# Patient Record
Sex: FEMALE | Race: WHITE | Employment: FULL TIME | URBAN - METROPOLITAN AREA
[De-identification: names, ages, dates, MRNs, and addresses within clinical notes are randomized per-mention and may not be internally consistent; named-entity substitution may affect disease eponyms.]

---

## 2018-05-04 PROBLEM — D72.810 LYMPHOCYTOPENIA: Status: ACTIVE | Noted: 2018-05-04

## 2018-05-04 PROBLEM — R20.2 TINGLING IN EXTREMITIES: Status: ACTIVE | Noted: 2018-05-04

## 2018-05-04 PROBLEM — Z78.9 VEGAN: Status: ACTIVE | Noted: 2018-05-04

## 2018-05-04 NOTE — PROGRESS NOTES
Payam Lacy is a 33year old female. HPI:     New patient. Finger tips and toe tips with tingling. Intermittent for about 3-4 weeks. Started with the thumbs. Most recently all her toe tips.   Pt is vegan x 3 years, just started B12 supplement 2 denzel nervous/anxious. All other systems reviewed and are negative.       EXAM:   /68 (BP Location: Left arm, Patient Position: Sitting, Cuff Size: large)   Pulse 80   Resp 16   Ht 63\"   Wt 105 lb 3.2 oz   LMP 04/18/2018   BMI 18.64 kg/m²  Estimated bod started. Check B12 level. Consider further workup in the near future such as MRI of brain. - VITAMIN B12; Future    2. Lymphocytopenia  Recheck CBC.    - CBC WITH DIFFERENTIAL WITH PLATELET; Future    3. Vegan  Patient counseled on B12 supplement.

## 2018-05-07 NOTE — TELEPHONE ENCOUNTER
Medical records release, signed by patient, successfully faxed to [address] [place] [address] requesting recent head ct scan.     Medical records release, signed by patient, successfully faxed to Dr. Job Menendez requesting most recent pap report

## 2018-05-09 NOTE — TELEPHONE ENCOUNTER
Medical records received from Dr. Robin Led and placed in the folder to be signed off. Chart updated.

## 2018-05-12 NOTE — TELEPHONE ENCOUNTER
Lab results reviewed:  B12 is above normal.  Recommend decrease intake of B12 by half. Or she could try taking the B12 every other day.   Vitamin D is below normal, patient's medication list includes vitamin D, it may be that she is taking the vitamin D on

## 2018-06-05 NOTE — TELEPHONE ENCOUNTER
A representative of Yovani Peterson M.D. called to inform us that our mutual patient had labs that were ordered by Dr. Hillary Pedraza and done at our office, but they never received the results.   Per request, the lab flowsheet was successfully faxed to [facility], [facility].

## 2018-08-17 NOTE — PROGRESS NOTES
Malorie Lundy is a 33year old female. HPI:       Abdominal pain:  RUQ or right of center. Started 8-9 months ago. Had LFT and u/s that were negative. Pain subsided then came back a few months ago. Intermittent. When present stays constant.   H/o p Disorder Maternal Grandmother    • Heart Attack Maternal Grandfather    • MVA [OTHER] Paternal Grandmother    • Colon Cancer Paternal Grandfather      REVIEW OF SYSTEMS:   GENERAL HEALTH: overall feels well, no fever  SKIN: denies any unusual skin lesions (>17)    3. Anxiety  We will discuss further at next appointment.             Orders Placed This Encounter      Helicobacter pylori Breath Test, Adult    Meds & Refills for this Visit:  Signed Prescriptions Disp Refills    Pantoprazole Sodium 40 MG Oral Tab

## 2018-08-17 NOTE — PATIENT INSTRUCTIONS
Unknown Causes of Abdominal Pain (Female)    The exact cause of your abdominal (stomach) pain is not clear. This does not mean that this is something to worry about.  Everyone likes to know the exact cause of the problem, but sometimes with abdominal pain · Water is important so you do not get dehydrated. Soup may also be good. Sports drinks may also help, especially if they are not too acidic. Make sure you don't drink sugary drinks as this can make things worse. Take liquids in small amounts.  Do not guzzl © 5180-7995 [employer] [address]. [facility], [facility]2 [place]. All rights reserved. This information is not intended as a substitute for professional medical care. Always follow your healthcare professional's instructions.

## 2018-08-20 PROBLEM — F41.9 ANXIETY: Status: ACTIVE | Noted: 2018-08-20

## 2018-08-20 PROBLEM — R10.11 RIGHT UPPER QUADRANT ABDOMINAL PAIN: Status: ACTIVE | Noted: 2018-08-20

## 2018-08-20 PROBLEM — Z86.19 HISTORY OF HELICOBACTER PYLORI INFECTION: Status: ACTIVE | Noted: 2018-08-20

## 2018-08-21 NOTE — TELEPHONE ENCOUNTER
Medical records received from [facility] and placed in the folder to be signed off.

## 2018-08-21 NOTE — TELEPHONE ENCOUNTER
Medical records release, signed by patient, successfully faxed to Dr. Brenda Valentino at [phone], requesting most recent progress note. Medical records release, signed by patient, successfully faxed to [address] [place] [address] at [phone], requesting MRI report.

## 2018-08-21 NOTE — TELEPHONE ENCOUNTER
Pt called with information regarding her previous H Pylori test states she believes she last had it at [facility] in [place] the one on [address]. Pt states Dr. Vineet Vasquez had requested this info from patient.

## 2018-08-22 NOTE — TELEPHONE ENCOUNTER
MRI report received from [address] [place] [address], after review, Dr. Chuyita Hutton is recommending that pt follow up with neuro if she is still having headaches or any other neurologic symptoms.   Lm for pt to call back with a condition update, if symptoms still present refe

## 2018-08-23 NOTE — TELEPHONE ENCOUNTER
----- Message from Gloria Rubio DO sent at 8/22/2018  5:30 PM CDT -----  H pylori is negative, please inform patient. If she is still having symptoms or is worried, recommend make an appointment to see me for follow-up.

## 2018-08-23 NOTE — TELEPHONE ENCOUNTER
Medical records received form [facility] and placed in the folder to be signed off.

## 2018-11-26 NOTE — TELEPHONE ENCOUNTER
Pt called and wants to know if Dr. Yojana Castillo can write a letter of exception from getting the flu shot because last year she got a mild rash and she thinks it was from the shot. She needs to bring it to work.

## 2018-11-26 NOTE — TELEPHONE ENCOUNTER
Asked patient to fax a copy of influenza administration since we do not have any records on file. Patient received immunization at work last year.  She states that she got a mild rash on trunk after receiving vaccine

## 2020-08-13 NOTE — TELEPHONE ENCOUNTER
Patient states she has had a dry cough x2 years on and off. She believes it is allergy related. Denies fever, SOB or other respiratory concerns, loss of smell or taste, sore throat, body aches, N/V/D.      She has had direct exposure to brother and husb

## 2020-08-13 NOTE — TELEPHONE ENCOUNTER
\"BANANA\" has been added to appointment notes for cough and pain. Will be re-screened the day before for other symptoms.

## 2020-08-13 NOTE — TELEPHONE ENCOUNTER
I screened pt for her upcoming appt and she said she has a cough. She did say she has had it for a very long time though.

## 2020-08-13 NOTE — TELEPHONE ENCOUNTER
Has appointment 9/4/20.      Left message for patient to call back to further triage current symptoms

## 2020-09-04 PROBLEM — F41.9 ANXIETY: Status: RESOLVED | Noted: 2018-08-20 | Resolved: 2020-09-04

## 2020-09-04 PROBLEM — R05.9 COUGH: Status: ACTIVE | Noted: 2020-09-04

## 2020-09-04 PROBLEM — Z78.9 VEGAN DIET: Status: ACTIVE | Noted: 2020-09-04

## 2020-09-04 PROBLEM — Z78.9 VEGAN DIET: Status: RESOLVED | Noted: 2020-09-04 | Resolved: 2020-09-04

## 2020-09-04 PROBLEM — R10.13 EPIGASTRIC ABDOMINAL PAIN: Status: ACTIVE | Noted: 2020-09-04

## 2020-09-04 PROBLEM — R20.2 TINGLING IN EXTREMITIES: Status: RESOLVED | Noted: 2018-05-04 | Resolved: 2020-09-04

## 2020-09-04 NOTE — PROGRESS NOTES
HPI:   Alisa Peña is a 35year old female   Symptoms: denies discharge, itching, burning or dysuria, periods are regular.    Last PAP: about 3 years ago  Abnormal PAP: no        Epigastric abdominal pain:  Also in area of right lower ribs medially and • Heart Attack Maternal Grandfather    • Other (MVA) Paternal Grandmother    • Colon Cancer Paternal Grandfather       Social History:   Social History    Tobacco Use      Smoking status: Never Smoker      Smokeless tobacco: Never Used    Alcohol use: Jesenia Remedies rales, rhonchi or wheezing  CARDIO: RRR without murmur normal S1S2  ABD:  normal bowel sounds,soft, non tender, no masses, HSM or tenderness  : External genitalia normal. Speculum exam: Vagina normal, normal vaginal mucosa normal discharge, and normal ce abdominal pain  5. Right upper quadrant abdominal pain  Hiatal hernia versus H. pylori infection versus other. Recurrent epigastric abdominal pain.   Recommend recheck H. pylori breath test and make appointment to see gastroenterologist, Dr. Homa Garvin

## 2020-09-04 NOTE — PATIENT INSTRUCTIONS
-If the cough does not end up being related to a GI issue, please follow-up with Dr. Tri Reed for possible asthma.         Prevention Guidelines, Women Ages 25 to 44  Screening tests and vaccines are an important part of managing your healt during pregnancy after the 24th week.     Gonorrhea Sexually active women at increased risk for infection  At routine exams   Hepatitis C Anyone at increased risk  At routine exams   HIV All women At routine exams3     Obesity All women in this age group polysaccharide vaccine (PPSV23)  Women at increased risk for infection should talk with their healthcare provider  PCV13: 1 dose ages 23 to 72 (protects against 13 types of pneumococcal bacteria)   PPSV23: 1 to 2 doses through age 59, or 1 dose at 72 or ol Cory last reviewed this educational content on 10/1/2017  © 8624-1564 [employer] [address]. 1407 [facility], [facility] [place]. All rights reserved. This information is not intended as a substitute for professional medical care.  Always fo

## 2022-02-03 PROBLEM — R05.3 CHRONIC COUGH: Status: ACTIVE | Noted: 2022-02-03

## 2022-02-03 PROBLEM — E55.9 VITAMIN D INSUFFICIENCY: Status: ACTIVE | Noted: 2022-02-03

## 2022-03-23 PROBLEM — K21.9 GERD (GASTROESOPHAGEAL REFLUX DISEASE): Status: ACTIVE | Noted: 2022-03-23

## 2022-03-23 PROBLEM — K21.00 GERD WITH ESOPHAGITIS: Status: ACTIVE | Noted: 2022-03-23

## 2022-03-23 PROBLEM — R10.11 RIGHT UPPER QUADRANT PAIN: Status: ACTIVE | Noted: 2022-03-23

## 2023-02-01 PROBLEM — R10.11 RIGHT UPPER QUADRANT ABDOMINAL PAIN: Status: RESOLVED | Noted: 2018-08-20 | Resolved: 2023-02-01

## 2023-02-01 PROBLEM — R10.11 RIGHT UPPER QUADRANT PAIN: Status: RESOLVED | Noted: 2022-03-23 | Resolved: 2023-02-01

## 2023-02-01 PROBLEM — R05.3 CHRONIC COUGH: Status: RESOLVED | Noted: 2022-02-03 | Resolved: 2023-02-01

## 2023-02-01 PROBLEM — R05.9 COUGH: Status: RESOLVED | Noted: 2020-09-04 | Resolved: 2023-02-01

## 2023-02-01 PROBLEM — D72.810 LYMPHOCYTOPENIA: Status: RESOLVED | Noted: 2018-05-04 | Resolved: 2023-02-01

## 2023-02-06 NOTE — TELEPHONE ENCOUNTER
Patient calling to initiate prenatal care  LMP 1/3/23  Patient is 7-8 weeks on 2/28/23  Confirmation Ultrasound and Appointment scheduled on   Future Appointments   Date Time Provider Saran Kuo   2/28/2023 11:15 AM [facility] US BOSE [facility] [place]   2/28/2023 11:45 AM Catarina Mc MD [facility] N [facility] [place]   3/28/2023  8:30 AM UZAIR Dodd [facility] P [facility] [address]         Any history of ectopic pregnancy? No  Any history of miscarriage? No  Any medications that you are taking on a regular basis other than prenatal vitamins?  NO- pt questions if its ok to take the prenatal vitamins along with the omega 3/ dha supplements (if not taking prenatal vitamins, encourage patient to start taking.)  Any bleeding since the first day of last LMP and your positive pregnancy test? No    Insurance [facility]

## 2023-02-13 NOTE — ED INITIAL ASSESSMENT (HPI)
Pt states is 6 weeks pregnant, noted right sided pelvic pain this morning, denies vaginal bleeding, sent by OB to r/o ectopic.  .

## 2023-02-14 NOTE — PROGRESS NOTES
1st attempt; pt had recent ED visit, calling to offer PCP f/u apt (dc 2/13)      Dr. Tanisha Ramírez  [address] [facility] ([facility])  [phone]    LVM for pt if assisted still needed call [phone]

## 2023-04-24 PROBLEM — Z34.00 SUPERVISION OF NORMAL FIRST PREGNANCY, ANTEPARTUM: Status: ACTIVE | Noted: 2023-04-24

## 2023-04-24 PROBLEM — Z34.00 SUPERVISION OF NORMAL FIRST PREGNANCY, ANTEPARTUM (HCC): Status: ACTIVE | Noted: 2023-04-24

## 2023-04-24 NOTE — PROGRESS NOTES
AIDA    GA 15w6d   23  1313   BP: 118/68   Pulse: 81   Weight: 116 lb 9.6 oz (52.9 kg)       Doing well. No complaints. Denies abdominal/pelvic pain or vaginal bleeding. Rh +   Genetic screening declined    Recommend Anatomy scan 20 wks   Prenatal labs reviewed   1 episode of epigastric pain associated with her meal.  Resolved. Recommend Tums as needed. Recommend pepcid daily if persistent and or worsening. Problem List Items Addressed This Visit        Gravid and     Supervision of normal first pregnancy, antepartum - Primary   Other Visit Diagnoses     Prenatal care, antepartum        Relevant Orders    URINALYSIS NONAUTO W/O SCOPE (OB URISTIX) (Completed)            RTC in 4 weeks           Note to patient and family   The [address] makes medical notes available to patients in the interest of transparency. However, please be advised that this is a medical document. It is intended as zesl-lu-jsja communication. It is written and medical language may contain abbreviations or verbiage that are technical and unfamiliar. It may appear blunt or direct. Medical documents are intended to carry relevant information, facts as evident, and the clinical opinion of the practitioner.

## 2023-05-25 NOTE — PROGRESS NOTES
AIDA  Doing well, starting to feel much better, only vomiting daily  FM- yes  She will call if she desires the MS AFP, discussed time limitation  RTC 4wks

## 2023-06-13 NOTE — PROGRESS NOTES
Dr. Lety Cruz has been assigned by the health insurance company to be the patient's Primary Care Physician (PCP). LVM for patient to call office, patient sees Dr. Ladonna Russell. Letter sent.

## 2023-06-22 NOTE — PROGRESS NOTES
AIDA  Doing well  FM is good  RH positive  1 hr glucose/ CBC ordered  TDAP information provided  RTC 4wks

## 2023-07-20 NOTE — PROGRESS NOTES
AIDA    GA: 28w2d   23  1006   BP: 116/80   Pulse: 80   Weight: 136 lb 9.6 oz (62 kg)       Doing well, +FM  Denies LOF/VB/uctx  Rh +, TDAP today received, EPDS 0  PTL and Fetal movement instructions given  Repeat HIV  ordered and discussed with patient    Problem List Items Addressed This Visit          Gravid and     Supervision of normal first pregnancy, antepartum - Primary    Relevant Orders    HIV AG AB COMBO     Other Visit Diagnoses       Prenatal care of primigravida, antepartum        Relevant Orders    URINALYSIS NONAUTO W/O SCOPE (OB URISTIX) (Completed)    Need for vaccination        Relevant Orders    IMMUNIZATION ADMINISTRATION    TETANUS, DIPHTHERIA TOXOIDS AND ACELLULAR PERTUSIS VACCINE (TDAP), >7 YEARS, IM USE (Completed)            RTC in 2 wks      Note to patient and family   The [address] makes medical notes available to patients in the interest of transparency. However, please be advised that this is a medical document. It is intended as lazs-it-ivyz communication. It is written and medical language may contain abbreviations or verbiage that are technical and unfamiliar. It may appear blunt or direct. Medical documents are intended to carry relevant information, facts as evident, and the clinical opinion of the practitioner.

## 2023-08-01 NOTE — TELEPHONE ENCOUNTER
Received breast pump orders from [facility]. [facility] signed and I faxed back.  Copy in plfd

## 2023-08-02 NOTE — PROGRESS NOTES
Patient presents with:  Prenatal Care: AIDA    Routine prenatal visit without complaints. Patient denies any bleeding, leaking fluid, cramping, or regular uterine contractions. Good fetal movement. Assessment/Plan:  30w1d doing well  Kick counts reminded  Reviewed  labor signs and symptoms. Reviewed vaccine recommendations: Tdap done. Diagnoses and all orders for this visit:    30 weeks gestation of pregnancy  -     URINALYSIS NONAUTO W/O SCOPE (OB URISTIX)       Return in about 2 weeks (around 2023) for Routine Prenatal Visit.

## 2023-08-02 NOTE — PATIENT INSTRUCTIONS
Pediatrician/Family Practice Referral    Pediatricians:    [phone]  [address]    [address] [facility],  [address]  [place], [address] [facility]    [address], [facility]2 [facility]    [facility]  [address]  [place], [facility]        Family Practice:    Dr. Luz Wynne and Dr. Belgica Murdock [id number]  Jennifer Live  [phone]    Dr. Danny Freeman and Dr. Kathryn Wise  [phone]    Dr. Jenny Mejia Sonny Mahad Diaz  [address], [address] [facility]    Dr. Alberto Bertrand  [address] [facility]. [address] [facility]    Dr. Piper Obrien  [address] [facility], [address] [place] [address]    Dr. Deidre Estrada  [address] [facility]  [place], [address] [facility]    Dr. Vera Dailey  [facility] [address]  [place], [place] [address]     [address] [facility] PRE-REGISTRATION    Thank you for choosing [facility] for you labor and delivery needs. We look forward to caring for you and your family in this exciting time. In order to better care for all of our patients, [facility] recommends that you complete your delivery registration as early in your pregnancy as possible. Registering for your delivery in advance saves you the time of doing so on your day of delivery and allows your care team to be better prepared for your delivery date. For more information about Labor and Delivery at [facility] and to pre-register, visit [address] [facility]. org--> Search Our Services-->Pregnancy & Baby. TWO WAYS TO REGISTER ONLINE    Epic MyChart allows access to multiple medical organizations, including Estelle Angel and other medical organizations that use the [employer] record system.   To add Estelle Angel or any other medical organization, just tap My Organizations at the top left corner of the login page in the [address]ilee, and then click Add Organization. [place] in [place] for your hospital stay through your [employer] account. After logging into [employer], click on Visits. Under Visits, click on [place] for Delivery and follow the directions to register. You may print the confirmation page. If you do not already have a [employer] account, ask our office for an Access Code. Go to [employer]. [email or web address] and follow the prompts to set up your account. [place]alls on [employer]. [employer]- Pre-register for your hospital stay through the convenient online form on our website. Go to [email or web address]. Scroll down the page and click on [address] out all of the required information and click submit. You will receive a confirmation of your submission. Questions about registering for your hospital stay? Contact the [facility] at [phone]. FETAL MOVEMENT CHART    Although not all women need to perform daily fetal movement counts, if you notice a decrease in fetal activity, you should contact our office immediately. Begin counting fetal movements at 32 weeks of pregnancy. You may find that your baby is more active after eating or drinking. We want you to time how long it takes to feel 10 movements (kicks, flutters, swishes or rolls). You should feel at least 10 movements in 2 hours. You will likely feel 10 movements in less than 2 hours. If you have concerns, you can use the attached table to record movements. Record the time you feel the first movement and the tenth movement. This will help you observe patterns and discover how long it normally takes your baby to move 10 times. Please call us if any of the following occurs:   You have not felt the baby move for a couple of hours  It is taking longer each day to get the tenth movement    The main point is we want you to know the characteristics of your baby, so you can tell the doctor or nurse if something different is happening. Again, if you notice a decrease in fetal movement, please give the office a call.     If our office is closed, the answering service will page the doctor on-call.    ------------------------------      Time M T W Th F S S                                                                                                                 Time M T W Th F S S                                                                                                           Time M T W Th F S S                                                                                                           Time M T W Th F S S

## 2023-08-17 NOTE — PROGRESS NOTES
Return OB Visit 28-33 WGA      GA: 32w2d   23  1045   BP: 118/76   Pulse: 95   Weight: 143 lb 2 oz (64.9 kg)   Height: 62\"       Doing well, +FM  Denies LOF/VB/uctx  Rh positive, TDAP 2023 received, EPDS Depression Scale Total: 0 (2023 10:08 AM)   PTL and Fetal movement instructions given  3rd T HIV NR      Patient Active Problem List    Supervision of normal first pregnancy, antepartum      GERD (gastroesophageal reflux disease)      GERD with esophagitis      Vitamin D insufficiency      Epigastric abdominal pain            Recurrent      History of Helicobacter pylori infection            Positive breath test      Vegan          RTC in 2 wks      Note to patient and family   The [address] makes medical notes available to patients in the interest of transparency. However, please be advised that this is a medical document. It is intended as byjd-ga-bglm communication. It is written and medical language may contain abbreviations or verbiage that are technical and unfamiliar. It may appear blunt or direct. Medical documents are intended to carry relevant information, facts as evident, and the clinical opinion of the practitioner.       Amberly Escobar MD

## 2023-09-02 PROBLEM — U07.1 COVID-19 AFFECTING PREGNANCY IN THIRD TRIMESTER (HCC): Status: ACTIVE | Noted: 2023-09-02

## 2023-09-02 PROBLEM — O98.513 COVID-19 AFFECTING PREGNANCY IN THIRD TRIMESTER (HCC): Status: ACTIVE | Noted: 2023-09-02

## 2023-09-02 PROBLEM — U07.1 COVID-19 AFFECTING PREGNANCY IN THIRD TRIMESTER: Status: ACTIVE | Noted: 2023-09-02

## 2023-09-02 PROBLEM — O98.513 COVID-19 AFFECTING PREGNANCY IN THIRD TRIMESTER: Status: ACTIVE | Noted: 2023-09-02

## 2023-09-18 ENCOUNTER — HOSPITAL ENCOUNTER (OUTPATIENT)
Facility: HOSPITAL | Age: 33
Discharge: HOME | End: 2023-09-18
Attending: OBSTETRICS & GYNECOLOGY | Admitting: OBSTETRICS & GYNECOLOGY
Payer: COMMERCIAL

## 2023-09-18 VITALS
HEART RATE: 99 BPM | RESPIRATION RATE: 20 BRPM | DIASTOLIC BLOOD PRESSURE: 88 MMHG | BODY MASS INDEX: 27.23 KG/M2 | WEIGHT: 148 LBS | HEIGHT: 62 IN | SYSTOLIC BLOOD PRESSURE: 136 MMHG | TEMPERATURE: 99 F

## 2023-09-18 PROBLEM — Z03.71 NO LEAKAGE OF AMNIOTIC FLUID INTO VAGINA: Status: ACTIVE | Noted: 2023-09-18

## 2023-09-18 PROBLEM — K21.9 GERD (GASTROESOPHAGEAL REFLUX DISEASE): Status: RESOLVED | Noted: 2022-03-23 | Resolved: 2023-09-18

## 2023-09-18 PROCEDURE — 59025 FETAL NON-STRESS TEST: CPT | Performed by: OBSTETRICS & GYNECOLOGY

## 2023-09-18 NOTE — PROGRESS NOTES
NST Reactive  moderate variability, baseline 130, + accels, no decels  frequent contractions but not reported or palpable by patient. Discharged to home. F/u as scheduled.      Richelle Aguayo MD   [facility] - OBGYN

## 2023-09-18 NOTE — PROGRESS NOTES
EFMs applied, FHTs 155. Pt states she had some watery discharge today and wasn't sure if she was leaking fluid or not. Pt denies ctxs and VB. +FM. Pt denies any problems with pregnancy. Pt has a Hx of kidney stones, denies any other medical problems. Admission assessment initiated at this time.

## 2023-09-18 NOTE — PROGRESS NOTES
Dr Ted Castellanos called per this RN. This RN informs her pt 36+6, . Pt here for r/o ROM. Neg ferning, Neg pooling, Neg Amniotest. NST reactive. Pt louis but does not feel them. Orders received.

## 2023-09-18 NOTE — NST
Nonstress Test   Patient: Leeann Jurado    Gestation: 36w6d    NST:       Variability: Moderate           Accelerations: Yes           Decelerations: None            Baseline: 135 BPM           Uterine Irritability: No           Contractions: Irregular                    Contraction Frequency: 2-4.5                   Acoustic Stimulator: No           Nonstress Test Interpretation: Reactive           Nonstress Test Second Interpretation: Reactive                     Additional Comments

## 2023-09-20 ENCOUNTER — ROUTINE PRENATAL (OUTPATIENT)
Dept: OBGYN CLINIC | Facility: CLINIC | Age: 33
End: 2023-09-20
Payer: COMMERCIAL

## 2023-09-20 ENCOUNTER — TELEPHONE (OUTPATIENT)
Dept: OBGYN CLINIC | Facility: CLINIC | Age: 33
End: 2023-09-20

## 2023-09-20 VITALS
BODY MASS INDEX: 28.25 KG/M2 | WEIGHT: 153.5 LBS | SYSTOLIC BLOOD PRESSURE: 104 MMHG | HEIGHT: 62 IN | HEART RATE: 92 BPM | DIASTOLIC BLOOD PRESSURE: 72 MMHG

## 2023-09-20 DIAGNOSIS — Z23 NEED FOR VACCINATION: Primary | ICD-10-CM

## 2023-09-20 DIAGNOSIS — Z34.90 PRENATAL CARE, ANTEPARTUM: ICD-10-CM

## 2023-09-20 PROCEDURE — 3078F DIAST BP <80 MM HG: CPT | Performed by: OBSTETRICS & GYNECOLOGY

## 2023-09-20 PROCEDURE — 90686 IIV4 VACC NO PRSV 0.5 ML IM: CPT | Performed by: OBSTETRICS & GYNECOLOGY

## 2023-09-20 PROCEDURE — 90471 IMMUNIZATION ADMIN: CPT | Performed by: OBSTETRICS & GYNECOLOGY

## 2023-09-20 PROCEDURE — 3008F BODY MASS INDEX DOCD: CPT | Performed by: OBSTETRICS & GYNECOLOGY

## 2023-09-20 PROCEDURE — 3074F SYST BP LT 130 MM HG: CPT | Performed by: OBSTETRICS & GYNECOLOGY

## 2023-09-20 NOTE — PROGRESS NOTES
Patient presents with:  Prenatal Care: AIDA    Routine prenatal visit without complaints. Patient denies any bleeding, leaking fluid, cramping, or regular uterine contractions. Good fetal movement. SVE: cl/th/-4 breech  Assessment/Plan:  37w1d doing well  GBS neg  Breech presentation- discussed option of expectant management, scheduled primary  section and trial of external version, with risks including PROM, abruptio, fetal distress, labor, cord entanglement and need for emergency  section. Patient desires primary  section. Request sent   Kick counts reviewed. Reviewed labor signs and symptoms. Diagnoses and all orders for this visit:    Need for vaccination  -     INFLUENZA VACCINE, QUAD, PRESERVATIVE FREE, 0.5 ML    Prenatal care, antepartum       Return in about 1 week (around 2023) for Routine Prenatal Visit.

## 2023-09-20 NOTE — TELEPHONE ENCOUNTER
----- Message from Matt Ulloa MD sent at 2023  9:04 AM CDT -----  Surgeon:     Date: 39 wks    Type of Admit: Surgery Admit Inpatient    Procedure Location: L&D    PreOp Dx: Term pregnancy, breech presentation    Procedure: Primary  section    Anesthesia: Spinal    Antibiotics: Initiate antibiotics per Gustavo Grossman adult preoperative prophylactic antibiotic protocol     Pre Op Orders: Please use [employer] Orders

## 2023-09-27 ENCOUNTER — TELEPHONE (OUTPATIENT)
Dept: OBGYN UNIT | Facility: HOSPITAL | Age: 33
End: 2023-09-27

## 2023-09-27 ENCOUNTER — ROUTINE PRENATAL (OUTPATIENT)
Dept: OBGYN CLINIC | Facility: CLINIC | Age: 33
End: 2023-09-27
Payer: COMMERCIAL

## 2023-09-27 ENCOUNTER — TELEPHONE (OUTPATIENT)
Dept: OBGYN CLINIC | Facility: CLINIC | Age: 33
End: 2023-09-27

## 2023-09-27 VITALS
BODY MASS INDEX: 28 KG/M2 | SYSTOLIC BLOOD PRESSURE: 118 MMHG | DIASTOLIC BLOOD PRESSURE: 78 MMHG | HEART RATE: 74 BPM | WEIGHT: 153.81 LBS

## 2023-09-27 DIAGNOSIS — Z34.00 SUPERVISION OF NORMAL FIRST PREGNANCY, ANTEPARTUM: Primary | ICD-10-CM

## 2023-09-27 DIAGNOSIS — O98.513 COVID-19 AFFECTING PREGNANCY IN THIRD TRIMESTER: ICD-10-CM

## 2023-09-27 DIAGNOSIS — U07.1 COVID-19 AFFECTING PREGNANCY IN THIRD TRIMESTER: ICD-10-CM

## 2023-09-27 PROBLEM — Z03.71 NO LEAKAGE OF AMNIOTIC FLUID INTO VAGINA: Status: RESOLVED | Noted: 2023-09-18 | Resolved: 2023-09-27

## 2023-09-27 PROCEDURE — 3078F DIAST BP <80 MM HG: CPT | Performed by: OBSTETRICS & GYNECOLOGY

## 2023-09-27 PROCEDURE — 3074F SYST BP LT 130 MM HG: CPT | Performed by: OBSTETRICS & GYNECOLOGY

## 2023-09-27 NOTE — TELEPHONE ENCOUNTER
Pt dropped off [place] paperwork at [place] office. Filled out SID and FCR and emailed to [email or web address]. Sent interoffice mail to corporate center/4th floor forms dept. $25 collected.

## 2023-09-27 NOTE — PROGRESS NOTES
AIDA    GA: 38w1d   23  0900   BP: 118/78   Pulse: 74   Weight: 153 lb 12.8 oz (69.8 kg)       Doing well, +FM  Denies LOF/VB/uctx  Mode of delivery:   anticipated  SVE deferred    GBS neg  Fetal movement count given  Labor precautions provided   Bird breech, confirmed on bedside ultrasound today. Continue with plan for primary  section scheduled on 10/5/2023. Refer to spinning babies and chiropractor if desires acupuncture. Declines ECV. Precautions provided. Problem List Items Addressed This Visit          Gravid and     Supervision of normal first pregnancy, antepartum - Primary    Breech presentation, no version       Infectious Diseases    COVID-19 affecting pregnancy in third trimester         RTC 1 week            Note to patient and family   The [address] makes medical notes available to patients in the interest of transparency. However, please be advised that this is a medical document. It is intended as usvg-hx-sxpe communication. It is written and medical language may contain abbreviations or verbiage that are technical and unfamiliar. It may appear blunt or direct. Medical documents are intended to carry relevant information, facts as evident, and the clinical opinion of the practitioner.

## 2023-09-28 NOTE — TELEPHONE ENCOUNTER
FMLA forms received and logged for processing. SID is incomplete as pt indicate for forms to be faxed once completed. No fax # provided, [employer] message sent to pt.

## 2023-10-04 ENCOUNTER — ROUTINE PRENATAL (OUTPATIENT)
Dept: OBGYN CLINIC | Facility: CLINIC | Age: 33
End: 2023-10-04
Payer: COMMERCIAL

## 2023-10-04 VITALS
WEIGHT: 157 LBS | HEART RATE: 91 BPM | DIASTOLIC BLOOD PRESSURE: 70 MMHG | BODY MASS INDEX: 29 KG/M2 | SYSTOLIC BLOOD PRESSURE: 124 MMHG

## 2023-10-04 DIAGNOSIS — Z34.90 PRENATAL CARE, ANTEPARTUM: Primary | ICD-10-CM

## 2023-10-04 PROCEDURE — 3074F SYST BP LT 130 MM HG: CPT | Performed by: OBSTETRICS & GYNECOLOGY

## 2023-10-04 PROCEDURE — 3078F DIAST BP <80 MM HG: CPT | Performed by: OBSTETRICS & GYNECOLOGY

## 2023-10-04 NOTE — PROGRESS NOTES
Patient presents with:  Prenatal Care: AIDA    Routine prenatal visit without complaints. Patient denies any bleeding, leaking fluid, cramping, or regular uterine contractions. Good fetal movement. Assessment/Plan:  39w1d doing well  GBS neg  Breech presentation- PCS tomorrow  Kick counts reviewed. Reviewed labor signs and symptoms. Diagnoses and all orders for this visit:    Prenatal care, antepartum       Return for Post Operative Visit.

## 2023-10-05 ENCOUNTER — ANESTHESIA (OUTPATIENT)
Dept: OBGYN UNIT | Facility: HOSPITAL | Age: 33
End: 2023-10-05
Payer: COMMERCIAL

## 2023-10-05 ENCOUNTER — ANESTHESIA EVENT (OUTPATIENT)
Dept: OBGYN UNIT | Facility: HOSPITAL | Age: 33
End: 2023-10-05
Payer: COMMERCIAL

## 2023-10-05 ENCOUNTER — HOSPITAL ENCOUNTER (INPATIENT)
Facility: HOSPITAL | Age: 33
LOS: 3 days | Discharge: HOME | End: 2023-10-08
Attending: OBSTETRICS & GYNECOLOGY | Admitting: OBSTETRICS & GYNECOLOGY
Payer: COMMERCIAL

## 2023-10-05 PROBLEM — Z98.891 STATUS POST PRIMARY LOW TRANSVERSE CESAREAN SECTION: Status: ACTIVE | Noted: 2023-10-05

## 2023-10-05 PROBLEM — Z34.90 PREGNANCY (HCC): Status: ACTIVE | Noted: 2023-10-05

## 2023-10-05 PROBLEM — Z34.90 PREGNANCY: Status: ACTIVE | Noted: 2023-10-05

## 2023-10-05 LAB
ANTIBODY SCREEN: NEGATIVE
BASOPHILS # BLD AUTO: 0.04 X10(3) UL (ref 0–0.2)
BASOPHILS NFR BLD AUTO: 0.4 %
EOSINOPHIL # BLD AUTO: 0.09 X10(3) UL (ref 0–0.7)
EOSINOPHIL NFR BLD AUTO: 0.9 %
ERYTHROCYTE [DISTWIDTH] IN BLOOD BY AUTOMATED COUNT: 13.1 %
HCT VFR BLD AUTO: 33.4 %
HGB BLD-MCNC: 11.9 G/DL
IMM GRANULOCYTES # BLD AUTO: 0.1 X10(3) UL (ref 0–1)
IMM GRANULOCYTES NFR BLD: 1 %
LYMPHOCYTES # BLD AUTO: 1.97 X10(3) UL (ref 1–4)
LYMPHOCYTES NFR BLD AUTO: 20.5 %
MCH RBC QN AUTO: 33 PG (ref 26–34)
MCHC RBC AUTO-ENTMCNC: 35.6 G/DL (ref 31–37)
MCV RBC AUTO: 92.5 FL
MONOCYTES # BLD AUTO: 0.65 X10(3) UL (ref 0.1–1)
MONOCYTES NFR BLD AUTO: 6.8 %
NEUTROPHILS # BLD AUTO: 6.76 X10 (3) UL (ref 1.5–7.7)
NEUTROPHILS # BLD AUTO: 6.76 X10(3) UL (ref 1.5–7.7)
NEUTROPHILS NFR BLD AUTO: 70.4 %
PLATELET # BLD AUTO: 170 10(3)UL (ref 150–450)
RBC # BLD AUTO: 3.61 X10(6)UL
RH BLOOD TYPE: POSITIVE
T PALLIDUM AB SER QL IA: NONREACTIVE
WBC # BLD AUTO: 9.6 X10(3) UL (ref 4–11)

## 2023-10-05 PROCEDURE — 59510 CESAREAN DELIVERY: CPT | Performed by: OBSTETRICS & GYNECOLOGY

## 2023-10-05 PROCEDURE — 59514 CESAREAN DELIVERY ONLY: CPT | Performed by: OBSTETRICS & GYNECOLOGY

## 2023-10-05 RX ORDER — DOCUSATE SODIUM 100 MG/1
100 CAPSULE, LIQUID FILLED ORAL
Status: DISCONTINUED | OUTPATIENT
Start: 2023-10-05 | End: 2023-10-08

## 2023-10-05 RX ORDER — CITRIC ACID/SODIUM CITRATE 334-500MG
30 SOLUTION, ORAL ORAL ONCE
Status: DISCONTINUED | OUTPATIENT
Start: 2023-10-05 | End: 2023-10-05 | Stop reason: HOSPADM

## 2023-10-05 RX ORDER — METOCLOPRAMIDE HYDROCHLORIDE 5 MG/ML
INJECTION INTRAMUSCULAR; INTRAVENOUS AS NEEDED
Status: DISCONTINUED | OUTPATIENT
Start: 2023-10-05 | End: 2023-10-06 | Stop reason: SURG

## 2023-10-05 RX ORDER — HYDROMORPHONE HYDROCHLORIDE 1 MG/ML
0.3 INJECTION, SOLUTION INTRAMUSCULAR; INTRAVENOUS; SUBCUTANEOUS EVERY 2 HOUR PRN
Status: DISCONTINUED | OUTPATIENT
Start: 2023-10-05 | End: 2023-10-08

## 2023-10-05 RX ORDER — SODIUM CHLORIDE, SODIUM LACTATE, POTASSIUM CHLORIDE, CALCIUM CHLORIDE 600; 310; 30; 20 MG/100ML; MG/100ML; MG/100ML; MG/100ML
125 INJECTION, SOLUTION INTRAVENOUS CONTINUOUS
Status: DISCONTINUED | OUTPATIENT
Start: 2023-10-05 | End: 2023-10-05 | Stop reason: HOSPADM

## 2023-10-05 RX ORDER — ONDANSETRON 2 MG/ML
INJECTION INTRAMUSCULAR; INTRAVENOUS
Status: COMPLETED
Start: 2023-10-05 | End: 2023-10-05

## 2023-10-05 RX ORDER — SIMETHICONE 80 MG
80 TABLET,CHEWABLE ORAL 3 TIMES DAILY PRN
Status: DISCONTINUED | OUTPATIENT
Start: 2023-10-05 | End: 2023-10-08

## 2023-10-05 RX ORDER — ONDANSETRON 2 MG/ML
INJECTION INTRAMUSCULAR; INTRAVENOUS AS NEEDED
Status: DISCONTINUED | OUTPATIENT
Start: 2023-10-05 | End: 2023-10-06 | Stop reason: SURG

## 2023-10-05 RX ORDER — BISACODYL 10 MG
10 SUPPOSITORY, RECTAL RECTAL ONCE AS NEEDED
Status: DISCONTINUED | OUTPATIENT
Start: 2023-10-05 | End: 2023-10-08

## 2023-10-05 RX ORDER — IBUPROFEN 600 MG/1
600 TABLET ORAL EVERY 6 HOURS
Status: DISCONTINUED | OUTPATIENT
Start: 2023-10-06 | End: 2023-10-08

## 2023-10-05 RX ORDER — OXYCODONE HYDROCHLORIDE 5 MG/1
5 TABLET ORAL EVERY 6 HOURS PRN
Status: DISCONTINUED | OUTPATIENT
Start: 2023-10-05 | End: 2023-10-08

## 2023-10-05 RX ORDER — MORPHINE SULFATE 2 MG/ML
INJECTION, SOLUTION INTRAMUSCULAR; INTRAVENOUS AS NEEDED
Status: DISCONTINUED | OUTPATIENT
Start: 2023-10-05 | End: 2023-10-06 | Stop reason: SURG

## 2023-10-05 RX ORDER — DEXAMETHASONE SODIUM PHOSPHATE 4 MG/ML
VIAL (ML) INJECTION AS NEEDED
Status: DISCONTINUED | OUTPATIENT
Start: 2023-10-05 | End: 2023-10-06 | Stop reason: SURG

## 2023-10-05 RX ORDER — CEFAZOLIN SODIUM/WATER 2 G/20 ML
2 SYRINGE (ML) INTRAVENOUS ONCE
Status: COMPLETED | OUTPATIENT
Start: 2023-10-05 | End: 2023-10-05

## 2023-10-05 RX ORDER — ACETAMINOPHEN 500 MG
1000 TABLET ORAL ONCE
Status: COMPLETED | OUTPATIENT
Start: 2023-10-05 | End: 2023-10-05

## 2023-10-05 RX ORDER — DEXTROSE, SODIUM CHLORIDE, SODIUM LACTATE, POTASSIUM CHLORIDE, AND CALCIUM CHLORIDE 5; .6; .31; .03; .02 G/100ML; G/100ML; G/100ML; G/100ML; G/100ML
INJECTION, SOLUTION INTRAVENOUS CONTINUOUS PRN
Status: DISCONTINUED | OUTPATIENT
Start: 2023-10-05 | End: 2023-10-08

## 2023-10-05 RX ORDER — ONDANSETRON 2 MG/ML
4 INJECTION INTRAMUSCULAR; INTRAVENOUS EVERY 6 HOURS PRN
Status: DISCONTINUED | OUTPATIENT
Start: 2023-10-05 | End: 2023-10-08

## 2023-10-05 RX ORDER — SODIUM CHLORIDE, SODIUM LACTATE, POTASSIUM CHLORIDE, CALCIUM CHLORIDE 600; 310; 30; 20 MG/100ML; MG/100ML; MG/100ML; MG/100ML
INJECTION, SOLUTION INTRAVENOUS CONTINUOUS
Status: DISCONTINUED | OUTPATIENT
Start: 2023-10-05 | End: 2023-10-08

## 2023-10-05 RX ORDER — ACETAMINOPHEN 500 MG
1000 TABLET ORAL EVERY 6 HOURS
Status: DISCONTINUED | OUTPATIENT
Start: 2023-10-05 | End: 2023-10-08

## 2023-10-05 RX ORDER — ONDANSETRON 2 MG/ML
4 INJECTION INTRAMUSCULAR; INTRAVENOUS EVERY 6 HOURS PRN
Status: DISCONTINUED | OUTPATIENT
Start: 2023-10-05 | End: 2023-10-05 | Stop reason: HOSPADM

## 2023-10-05 RX ORDER — GABAPENTIN 300 MG/1
300 CAPSULE ORAL EVERY 8 HOURS PRN
Status: DISCONTINUED | OUTPATIENT
Start: 2023-10-05 | End: 2023-10-08

## 2023-10-05 RX ORDER — KETOROLAC TROMETHAMINE 15 MG/ML
30 INJECTION, SOLUTION INTRAMUSCULAR; INTRAVENOUS EVERY 6 HOURS
Qty: 8 ML | Refills: 0 | Status: DISPENSED | OUTPATIENT
Start: 2023-10-05 | End: 2023-10-06

## 2023-10-05 RX ADMIN — DEXAMETHASONE SODIUM PHOSPHATE 4 MG: 4 MG/ML VIAL (ML) INJECTION at 09:30:00

## 2023-10-05 RX ADMIN — SODIUM CHLORIDE, SODIUM LACTATE, POTASSIUM CHLORIDE, CALCIUM CHLORIDE: 600; 310; 30; 20 INJECTION, SOLUTION INTRAVENOUS at 08:31:00

## 2023-10-05 RX ADMIN — CEFAZOLIN SODIUM/WATER 2 G: 2 G/20 ML SYRINGE (ML) INTRAVENOUS at 08:31:00

## 2023-10-05 RX ADMIN — ONDANSETRON 4 MG: 2 INJECTION INTRAMUSCULAR; INTRAVENOUS at 08:15:00

## 2023-10-05 RX ADMIN — METOCLOPRAMIDE HYDROCHLORIDE 10 MG: 5 INJECTION INTRAMUSCULAR; INTRAVENOUS at 08:15:00

## 2023-10-05 RX ADMIN — MORPHINE SULFATE 0.2 MG: 2 INJECTION, SOLUTION INTRAMUSCULAR; INTRAVENOUS at 08:32:00

## 2023-10-05 RX ADMIN — SODIUM CHLORIDE, SODIUM LACTATE, POTASSIUM CHLORIDE, CALCIUM CHLORIDE: 600; 310; 30; 20 INJECTION, SOLUTION INTRAVENOUS at 08:49:00

## 2023-10-05 NOTE — PROGRESS NOTES
Patient transferred to mother/baby room 1111 per cart in stable condition with baby and personal belongings. Accompanied by  and staff. Report given to mother/baby RN Joe Mcgrath.

## 2023-10-05 NOTE — PLAN OF CARE
Problem: BIRTH - VAGINAL/ SECTION  Goal: Fetal and maternal status remain reassuring during the birth process  Description: INTERVENTIONS:  - Monitor vital signs  - Monitor fetal heart rate  - Monitor uterine activity  - Monitor labor progression (vaginal delivery)  - DVT prophylaxis (C/S delivery)  - Surgical antibiotic prophylaxis (C/S delivery)  Outcome: Progressing     Problem: PAIN - ADULT  Goal: Verbalizes/displays adequate comfort level or patient's stated pain goal  Description: INTERVENTIONS:  - Encourage pt to monitor pain and request assistance  - Assess pain using appropriate pain scale  - Administer analgesics based on type and severity of pain and evaluate response  - Implement non-pharmacological measures as appropriate and evaluate response  - Consider cultural and social influences on pain and pain management  - Manage/alleviate anxiety  - Utilize distraction and/or relaxation techniques  - Monitor for opioid side effects  - Notify MD/LIP if interventions unsuccessful or patient reports new pain  - Anticipate increased pain with activity and pre-medicate as appropriate  Outcome: Progressing     Problem: ANXIETY  Goal: Will report anxiety at manageable levels  Description: INTERVENTIONS:  - Administer medication as ordered  - Teach and rehearse alternative coping skills  - Provide emotional support with 1:1 interaction with staff  Outcome: Progressing     Problem: Patient/Family Goals  Goal: Patient/Family Long Term Goal  Description: Patient's Long Term Goal: uncomplicated c section delivery    Interventions:  - See additional Care Plan goals for specific interventions  Outcome: Progressing  Goal: Patient/Family Short Term Goal  Description: Patient's Short Term Goal: pain management    Interventions:   - See additional Care Plan goals for specific interventions  Outcome: Progressing     Problem: SAFETY ADULT - FALL  Goal: Free from fall injury  Description: INTERVENTIONS:  - Assess pt frequently for physical needs  - Identify cognitive and physical deficits and behaviors that affect risk of falls.   - [place] fall precautions as indicated by assessment.  - Educate pt/family on patient safety including physical limitations  - Instruct pt to call for assistance with activity based on assessment  - Modify environment to reduce risk of injury  - Provide assistive devices as appropriate  - Consider OT/PT consult to assist with strengthening/mobility  - Encourage toileting schedule  Outcome: Progressing

## 2023-10-05 NOTE — PROGRESS NOTES
Pt is a 29year old female admitted to Labor and Delivery TRG 4. Patient presents with:  Scheduled     Pt is  39w2d intra-uterine pregnancy with breech presentation. Pt accompanied by spouse in stable condition. History obtained, consents signed. Oriented to room, staff, and plan of care.

## 2023-10-05 NOTE — ANESTHESIA PROCEDURE NOTES
Spinal Block    Performed by: Jamshid Huntley MD  Authorized by: Jamshid Huntley MD      General Information and Staff    Start Time:   Anesthesiologist:  Jamshid Huntley MD  Performed by:   Anesthesiologist  Site identification: surface landmarks    Preanesthetic Checklist: patient identified, IV checked, risks and benefits discussed, monitors and equipment checked, pre-op evaluation, timeout performed, anesthesia consent and sterile technique used      Procedure Details    Patient Position:  Sitting  Prep: ChloraPrep    Monitoring:  Cardiac monitor, heart rate and continuous pulse ox  Approach:  Midline  Location:  L3-4  Injection Technique:  Single-shot    Needle    Needle Type:  Sprotte  Needle Gauge:  24 G  Needle Length:  3.5 in    Assessment    Sensory Level:   Events: clear CSF, CSF aspirated, well tolerated and blood negative      Additional Comments

## 2023-10-06 LAB
BASOPHILS # BLD AUTO: 0.02 X10(3) UL (ref 0–0.2)
BASOPHILS NFR BLD AUTO: 0.1 %
EOSINOPHIL # BLD AUTO: 0.01 X10(3) UL (ref 0–0.7)
EOSINOPHIL NFR BLD AUTO: 0.1 %
ERYTHROCYTE [DISTWIDTH] IN BLOOD BY AUTOMATED COUNT: 13.2 %
HCT VFR BLD AUTO: 29.6 %
HGB BLD-MCNC: 9.9 G/DL
IMM GRANULOCYTES # BLD AUTO: 0.08 X10(3) UL (ref 0–1)
IMM GRANULOCYTES NFR BLD: 0.6 %
LYMPHOCYTES # BLD AUTO: 1.79 X10(3) UL (ref 1–4)
LYMPHOCYTES NFR BLD AUTO: 13.3 %
MCH RBC QN AUTO: 32.9 PG (ref 26–34)
MCHC RBC AUTO-ENTMCNC: 33.4 G/DL (ref 31–37)
MCV RBC AUTO: 98.3 FL
MONOCYTES # BLD AUTO: 0.89 X10(3) UL (ref 0.1–1)
MONOCYTES NFR BLD AUTO: 6.6 %
NEUTROPHILS # BLD AUTO: 10.7 X10 (3) UL (ref 1.5–7.7)
NEUTROPHILS # BLD AUTO: 10.7 X10(3) UL (ref 1.5–7.7)
NEUTROPHILS NFR BLD AUTO: 79.3 %
PLATELET # BLD AUTO: 159 10(3)UL (ref 150–450)
RBC # BLD AUTO: 3.01 X10(6)UL
WBC # BLD AUTO: 13.5 X10(3) UL (ref 4–11)

## 2023-10-07 PROBLEM — Z34.90 PREGNANCY: Status: RESOLVED | Noted: 2023-10-05 | Resolved: 2023-10-07

## 2023-10-07 PROBLEM — Z34.90 PREGNANCY (HCC): Status: RESOLVED | Noted: 2023-10-05 | Resolved: 2023-10-07

## 2023-10-07 RX ORDER — GABAPENTIN 300 MG/1
300 CAPSULE ORAL 3 TIMES DAILY PRN
Qty: 45 CAPSULE | Refills: 1 | Status: SHIPPED | OUTPATIENT
Start: 2023-10-07

## 2023-10-07 NOTE — DISCHARGE INSTRUCTIONS
Discharge Instructions    Medicines for pain:   -acetaminophen (Tylenol) 1000 mg every 6 hours as needed  -ibuprofen 600 mg every 6 hours as needed  *If pain has not improved enough with acetaminophen and ibuprofen, you can take gabapentin 300mg every 8 hours as needed  **If pain still has not improved enough with acetaminophen, ibuprofen, and gabapentin--you can take oxycodone every 4 hours as needed. Taking oxycodone can make you constipated so you should also take colace, below. For constipation: take colace (docusate) twice a day as needed. Can also take Miralax or Milk of Magnesia nightly if needed (over the counter)    Activity:  Nothing in the vagina for 6 weeks. No lifting more than 10-15 lb for 6 weeks  No driving while taking Gabapentin or oxycodone, or for approximately 1-2 weeks after surgery. Keep wound clean and dry. Wash gently with soap & pat dry at least 1 time daily. May cover wound with gauze, pad, or clean washcloth if needed. Call office for fever 100.4 or higher, increased vaginal bleeding soaking greater than 1 pad per hour, increased abdominal/pelvic pain, shortness of breath, chest pain, leg pain or swelling, persistent or severe headache, vision changes, persistent or severe upper abdominal pain, feeling depressed or extremely anxious, thoughts of self harm or harming infant(s). If you are having problems or have concerns or questions, please call our office as you may need to be seen sooner than your scheduled 2 week visit. MEDICATIONS offered/used during your stay:    @ MOTRIN (Ibuprofin 600mg)   1 tab every 6 hours as needed for pain   Last taken at:   --> Next dose due at:       @ TYLENOL (Acetaminophen 500)   1-2 tabs, every 6 hours, as needed for increased pain.   Last taken at:   --> Next dose due at:    @ COLACE (Docusate Sodium 100mg)  1 tab two times per day as needed for stool softening   (once in am/once in pm)  Last taken at:   --> Next dose due at:       Please see your Parent-Infant instruction pamphlet in your [facility] folder for further reference/review/instructions.

## 2023-10-07 NOTE — DISCHARGE SUMMARY
[facility]    Discharge Summary    Sylwia Barillas Patient Status:  Inpatient    3/15/1990 MRN [id number]   [facility] 1SW-J Attending Bassam Fong MD   [address] Day # 2 PCP Khoa Osorio DO     Admit Date: 10/5/2023    Discharge Date : 10/07/23      Attending Physician: Bassam Fong MD    Reason for Admission:  ***    Procedures:  ***  section for [facility] Course: 29year old  admitted at 39w2d for ***. ***. Uncomplicated ***CS for delivery of *** infant with APGARS ***. Postoperative course uncomplicated, met discharge criteria on POD***    Discharge Diagnoses: s/p ***  section    Discharged Condition: good    Disposition: home    Patient Instructions: Follow-up appointment with [facility] in 6 weeks.     Carmen Kuhn MD  10/7/2023  11:21 AM

## 2023-10-08 VITALS
RESPIRATION RATE: 16 BRPM | HEART RATE: 83 BPM | TEMPERATURE: 98 F | DIASTOLIC BLOOD PRESSURE: 90 MMHG | OXYGEN SATURATION: 95 % | SYSTOLIC BLOOD PRESSURE: 134 MMHG

## 2023-10-08 NOTE — PROGRESS NOTES
DISCHARGE NOTE  Discharge and follow-up appointment information reviewed with parents, no questions following. ID Bands checked and verified at bedside. HUGS and Kisses tags removed  Baby in: car seat   Parent in wheelchair.    Escorted off unit by:  PCT

## 2023-10-09 ENCOUNTER — PATIENT MESSAGE (OUTPATIENT)
Dept: OBGYN CLINIC | Facility: CLINIC | Age: 33
End: 2023-10-09

## 2023-10-10 NOTE — TELEPHONE ENCOUNTER
From: Carmen Taylor  To: Alejandra Blanton  Sent: 10/9/2023 3:20 PM CDT  Subject: Referral request     Hi! I have an appointment with [facility] Lactation Services this Friday, Oct 13th, which I will need a referral for.  Thank you for your time!    -Kristen Mata

## 2023-10-10 NOTE — TELEPHONE ENCOUNTER
Please review/ approve Lactation referral if appropriate. Patient has [facility] appointment with Location on 10/13/23    Thank you.

## 2023-10-11 ENCOUNTER — TELEPHONE (OUTPATIENT)
Dept: OBGYN UNIT | Facility: HOSPITAL | Age: 33
End: 2023-10-11
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
## 2023-10-12 NOTE — TELEPHONE ENCOUNTER
Dr. Enoc Becker,     *The ACKNOWLEDGE button has been moved to the top right ribbon*    Please sign off on form if you agree to: FMLA (maternity leave), start date: 10/5/23, end date: 8 weeks for  (pt may take up to 12 weeks blocked time)   (place your signature on the first page only)    -From your Inbasket, Highlight the patient and click Chart   -Double click the 1/77/10 Forms Completion telephone encounter  -Aby Yeh down to the Media section   -Click the blue Hyperlink:  REEMA Becker 68  -Click Acknowledge located in the top right ribbon/menu   -Drag the mouse into the blank space of the document and a + sign will appear. Left click to   electronically sign the document. Thank you,    Andrea Warner.

## 2023-10-13 ENCOUNTER — NURSE ONLY (OUTPATIENT)
Dept: LACTATION | Facility: HOSPITAL | Age: 33
End: 2023-10-13
Attending: OBSTETRICS & GYNECOLOGY
Payer: COMMERCIAL

## 2023-10-13 PROCEDURE — 99213 OFFICE O/P EST LOW 20 MIN: CPT

## 2023-10-19 ENCOUNTER — POSTPARTUM (OUTPATIENT)
Dept: OBGYN CLINIC | Facility: CLINIC | Age: 33
End: 2023-10-19
Payer: COMMERCIAL

## 2023-10-19 VITALS
DIASTOLIC BLOOD PRESSURE: 76 MMHG | HEART RATE: 82 BPM | SYSTOLIC BLOOD PRESSURE: 122 MMHG | WEIGHT: 134.25 LBS | BODY MASS INDEX: 25 KG/M2

## 2023-10-19 DIAGNOSIS — Z98.891 STATUS POST PRIMARY LOW TRANSVERSE CESAREAN SECTION: Primary | ICD-10-CM

## 2023-10-19 PROCEDURE — 3078F DIAST BP <80 MM HG: CPT | Performed by: NURSE PRACTITIONER

## 2023-10-19 PROCEDURE — 99024 POSTOP FOLLOW-UP VISIT: CPT | Performed by: NURSE PRACTITIONER

## 2023-10-19 PROCEDURE — 3074F SYST BP LT 130 MM HG: CPT | Performed by: NURSE PRACTITIONER

## 2023-10-23 NOTE — TELEPHONE ENCOUNTER
FMLA forms completed & faxed to [id number] at # [phone]. Fax confirmation received. [employer] message sent to pt.

## 2023-10-26 ENCOUNTER — TELEPHONE (OUTPATIENT)
Dept: OBGYN CLINIC | Facility: CLINIC | Age: 33
End: 2023-10-26

## 2023-11-08 ENCOUNTER — TELEPHONE (OUTPATIENT)
Dept: OBGYN CLINIC | Facility: CLINIC | Age: 33
End: 2023-11-08

## 2023-11-08 DIAGNOSIS — O92.29 POSTPARTUM NIPPLE PAIN: Primary | ICD-10-CM

## 2023-11-08 NOTE — TELEPHONE ENCOUNTER
Patient reports concern with a sharp shooting pain only in her left nipple. Pain is present while pumping and while breastfeeding. No outward signs of any problems- skin is intact and normal color. .  Patient denies any symptoms of mastitis- no fever, redness, hard areas or warmth to the touch. Patient states she was talking to a friend who had the same symptoms and that person was treated for a yeast infection and her symptoms resolved. Patient has already seen a lactation consultant regarding proper latch and holding techniques and is not experiencing any problems with the right breast.  Preferred pharmacy is [facility]. Patient has upcoming appointment with Dr. Phil Wesley on 11/13.

## 2023-11-08 NOTE — TELEPHONE ENCOUNTER
Can treat for presumed candidiasis, but would also want to make sure baby is evaluated for thrush. Rx sent.

## 2023-11-10 NOTE — TELEPHONE ENCOUNTER
Please try to avoid signing forms in the corner as it is not visible when printing and forms are not accepted this way. Thank you! Dr. Rishi Loving     *The ACKNOWLEDGE button has been moved to the top right ribbon*    Please sign off on form if you agree to: Disability   (place your signature on the first page only)    -From your Inbasket, Highlight the patient and click Chart   -Double click the 91/67/22 Forms Completion telephone encounter  -Scroll down to the Media section   -Click the blue Hyperlink: Disab Dr Rishi Loving 82/66/37  -Click Acknowledge located in the top right ribbon/menu   -Drag the mouse into the blank space of the document and a + sign will appear. Left click to   electronically sign the document.      Thank you,    Nona

## 2023-11-13 ENCOUNTER — POSTPARTUM (OUTPATIENT)
Dept: OBGYN CLINIC | Facility: CLINIC | Age: 33
End: 2023-11-13
Payer: COMMERCIAL

## 2023-11-13 VITALS
BODY MASS INDEX: 25 KG/M2 | HEART RATE: 90 BPM | DIASTOLIC BLOOD PRESSURE: 72 MMHG | WEIGHT: 136.13 LBS | SYSTOLIC BLOOD PRESSURE: 116 MMHG

## 2023-11-13 DIAGNOSIS — Z98.891 STATUS POST PRIMARY LOW TRANSVERSE CESAREAN SECTION: ICD-10-CM

## 2023-11-13 DIAGNOSIS — Z12.4 SCREENING FOR CERVICAL CANCER: ICD-10-CM

## 2023-11-13 PROBLEM — Z34.00 SUPERVISION OF NORMAL FIRST PREGNANCY, ANTEPARTUM (HCC): Status: RESOLVED | Noted: 2023-04-24 | Resolved: 2023-11-13

## 2023-11-13 PROBLEM — Z34.00 SUPERVISION OF NORMAL FIRST PREGNANCY, ANTEPARTUM: Status: RESOLVED | Noted: 2023-04-24 | Resolved: 2023-11-13

## 2023-11-13 PROBLEM — U07.1 COVID-19 AFFECTING PREGNANCY IN THIRD TRIMESTER (HCC): Status: RESOLVED | Noted: 2023-09-02 | Resolved: 2023-11-13

## 2023-11-13 PROBLEM — O98.513 COVID-19 AFFECTING PREGNANCY IN THIRD TRIMESTER: Status: RESOLVED | Noted: 2023-09-02 | Resolved: 2023-11-13

## 2023-11-13 PROBLEM — O98.513 COVID-19 AFFECTING PREGNANCY IN THIRD TRIMESTER (HCC): Status: RESOLVED | Noted: 2023-09-02 | Resolved: 2023-11-13

## 2023-11-13 PROBLEM — U07.1 COVID-19 AFFECTING PREGNANCY IN THIRD TRIMESTER: Status: RESOLVED | Noted: 2023-09-02 | Resolved: 2023-11-13

## 2023-11-13 PROCEDURE — 87624 HPV HI-RISK TYP POOLED RSLT: CPT | Performed by: OBSTETRICS & GYNECOLOGY

## 2023-11-13 PROCEDURE — 88175 CYTOPATH C/V AUTO FLUID REDO: CPT | Performed by: OBSTETRICS & GYNECOLOGY

## 2023-11-14 LAB — HPV I/H RISK 1 DNA SPEC QL NAA+PROBE: NEGATIVE

## 2023-11-14 NOTE — TELEPHONE ENCOUNTER
Called patient and [employer]- Multiple Fax failures wanted to ask patient if MyChart was a good option to send her forms or if she has a new fax number.

## 2023-11-18 LAB
.: NORMAL
.: NORMAL

## 2023-12-14 ENCOUNTER — TELEPHONE (OUTPATIENT)
Dept: FAMILY MEDICINE CLINIC | Facility: CLINIC | Age: 33
End: 2023-12-14

## 2023-12-14 NOTE — TELEPHONE ENCOUNTER
Patient calling to schedule an appt with Dr. Shazia Joshua for rectal bleeding. Patient is 10 days post partem. No appts available until 1/10/24. Please advise.

## 2023-12-14 NOTE — TELEPHONE ENCOUNTER
Spoke to patient. States the last couple BM's she has noticed some bleeding. Bright red blood small amount. She is post partem. She denies any abd pain or nausea or vomiting. Advised most likely hemorrhoids. Advised her to drink plenty of water, eat high fiber foods, consider miralax and/or colace stool softeners. Advised preparation H suppositories. Advised her to call back if symptoms continue or worsen. She VU and is in agreement.

## 2024-04-28 ENCOUNTER — HOSPITAL ENCOUNTER (EMERGENCY)
Age: 34
Discharge: HOME | End: 2024-04-28
Attending: EMERGENCY MEDICINE
Payer: COMMERCIAL

## 2024-04-28 VITALS
SYSTOLIC BLOOD PRESSURE: 130 MMHG | TEMPERATURE: 98 F | HEART RATE: 92 BPM | HEIGHT: 62 IN | BODY MASS INDEX: 22.08 KG/M2 | OXYGEN SATURATION: 98 % | WEIGHT: 120 LBS | DIASTOLIC BLOOD PRESSURE: 91 MMHG | RESPIRATION RATE: 18 BRPM
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
## 2024-04-28 PROCEDURE — 99283 EMERGENCY DEPT VISIT LOW MDM: CPT
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
## 2024-05-01 ENCOUNTER — OFFICE VISIT (OUTPATIENT)
Dept: FAMILY MEDICINE CLINIC | Facility: CLINIC | Age: 34
End: 2024-05-01
Payer: COMMERCIAL

## 2024-05-01 VITALS
WEIGHT: 117 LBS | DIASTOLIC BLOOD PRESSURE: 70 MMHG | HEART RATE: 90 BPM | RESPIRATION RATE: 15 BRPM | BODY MASS INDEX: 21.26 KG/M2 | SYSTOLIC BLOOD PRESSURE: 116 MMHG | HEIGHT: 62.36 IN | TEMPERATURE: 98 F | OXYGEN SATURATION: 99 %

## 2024-05-01 DIAGNOSIS — G89.29 CHRONIC MIDLINE THORACIC BACK PAIN: ICD-10-CM

## 2024-05-01 DIAGNOSIS — E55.9 VITAMIN D INSUFFICIENCY: ICD-10-CM

## 2024-05-01 DIAGNOSIS — D48.5 NEOPLASM OF UNCERTAIN BEHAVIOR OF SKIN: ICD-10-CM

## 2024-05-01 DIAGNOSIS — R20.2 PARESTHESIAS: ICD-10-CM

## 2024-05-01 DIAGNOSIS — Z78.9 VEGAN: ICD-10-CM

## 2024-05-01 DIAGNOSIS — Z00.00 ROUTINE GENERAL MEDICAL EXAMINATION AT A HEALTH CARE FACILITY: Primary | ICD-10-CM

## 2024-05-01 DIAGNOSIS — M54.6 CHRONIC MIDLINE THORACIC BACK PAIN: ICD-10-CM

## 2024-05-01 DIAGNOSIS — Z82.0 FAMILY HISTORY OF MS (MULTIPLE SCLEROSIS): ICD-10-CM

## 2024-05-01 PROCEDURE — 3078F DIAST BP <80 MM HG: CPT | Performed by: FAMILY MEDICINE

## 2024-05-01 PROCEDURE — 99395 PREV VISIT EST AGE 18-39: CPT | Performed by: FAMILY MEDICINE

## 2024-05-01 PROCEDURE — 99214 OFFICE O/P EST MOD 30 MIN: CPT | Performed by: FAMILY MEDICINE

## 2024-05-01 PROCEDURE — 3008F BODY MASS INDEX DOCD: CPT | Performed by: FAMILY MEDICINE

## 2024-05-01 PROCEDURE — 3074F SYST BP LT 130 MM HG: CPT | Performed by: FAMILY MEDICINE

## 2024-05-01 NOTE — PATIENT INSTRUCTIONS
-If the back pain does not resolve with physical therapy, please call to let Dr. Hunter know so that we can recommend next steps.

## 2024-05-01 NOTE — PROGRESS NOTES
Chief Complaint   Patient presents with    Wellness Visit    Back Pain    Derm Problem           Paresthesias         HPI:   Maria Luisa Olea is a 34 year old female       Back pain:  Patient points to mid to lower thoracic area just to the left of center.  No known precipitating or aggravating factors such as movement.  No history of injury.  Pain is intermittent.  Has improved in the past with her  massaging her back.      Paresthesias:  Patient points to right dorsal forearm and anterior right pretibial areas/right dorsal foot, the sensation is like a burning sensation.  Patient notes these symptoms resolved during pregnancy but have returned.  There is a family history of MIs.  Patient reports she had an MRI in the past, records were reviewed and this was in 2018.      Derm problem:  Patient requesting referral to dermatologist.  Has a spot on her back that is frequently itchy and does not go away.  Seems to be in the area of the band from her bra on the back.  Also has noticed a dark-colored spot.        Vegan:  Patient has been taking supplements, especially when pregnant.      Last PAP: UTD  Abnormal PAP: denies h/o abnl pap    Last colonoscopy: n/a  Last mammogram: n/a      Wt Readings from Last 6 Encounters:   24 117 lb (53.1 kg)   24 120 lb (54.4 kg)   23 136 lb 2 oz (61.7 kg)   10/19/23 134 lb 4 oz (60.9 kg)   10/04/23 157 lb (71.2 kg)   23 153 lb 12.8 oz (69.8 kg)     Body mass index is 21.15 kg/m².       Patient Active Problem List   Diagnosis    Paresthesias    Vegan    History of Helicobacter pylori infection    Epigastric abdominal pain    Vitamin D insufficiency    GERD with esophagitis    Status post primary low transverse  section    Chronic midline thoracic back pain    Family history of MS (multiple sclerosis)     Current Outpatient Medications   Medication Sig Dispense Refill    Nyutpx-Sbxhftpik-Cmqt-Fish Oil (PRENATAL + COMPLETE MULTI OR)       B  Complex-C Oral Tab Take 1 tablet by mouth daily.        Past Medical History:    Abdominal pain    RUQ    Breech presentation, no version (HCC)    Declines ECV  [ ] PCS 10/05    Calculus of kidney    No further issues with kidney stones after this date    Chronic cough    COVID-19 affecting pregnancy in third trimester (HCC)    Food intolerance    History of Helicobacter pylori infection    Positive breath test    Lymphocytopenia    Tingling in extremities    Fingertips and Toetips    Vegan    Wears glasses      Past Surgical History:   Procedure Laterality Date    Breast biopsy Right 06/2017    Skin graft procedure  08/2012    3rd finger on left hand    Carmel teeth removed Bilateral       Family History   Problem Relation Age of Onset    Hypertension Mother     Diabetes Mother         Type 2    Other (Lymphoma) Mother     Other (Cirrhosis) Mother     Colon Polyps Mother         50’s    Other Mother         Liver transplant from cirrhosis/liver cancer- Hepatitis C    Hypertension Father     Colon Polyps Father         50’s    Lung Disorder Maternal Grandmother     Heart Attack Maternal Grandfather     Other (MVA) Paternal Grandmother     Colon Cancer Paternal Grandfather     Hypertension Brother       Social History:   Social History     Socioeconomic History    Marital status:    Tobacco Use    Smoking status: Never    Smokeless tobacco: Never   Vaping Use    Vaping status: Never Used   Substance and Sexual Activity    Alcohol use: Not Currently     Alcohol/week: 0.0 standard drinks of alcohol     Comment: Occ    Drug use: Never    Sexual activity: Not Currently     Partners: Male   Other Topics Concern    Caffeine Concern No    Exercise No     Comment: 3x wk    Seat Belt Yes     Social Determinants of Health     Financial Resource Strain: Low Risk  (10/5/2023)    Financial Resource Strain     Difficulty of Paying Living Expenses: Not hard at all     Med Affordability: No   Food Insecurity: No Food  Insecurity (10/5/2023)    Food Insecurity     Food Insecurity: Never true   Transportation Needs: No Transportation Needs (10/5/2023)    Transportation Needs     Lack of Transportation: No   Stress: No Stress Concern Present (10/5/2023)    Stress     Feeling of Stress : No   Housing Stability: Low Risk  (10/5/2023)    Housing Stability     Housing Instability: No     Occ: works with [employer]. Marital Status: . Children: 1.   Exercise:  just started exercising this past weekend .    Diet:  vegan diet     REVIEW OF SYSTEMS:   GENERAL: Overall feels well  SKIN: As in HPI  EYES: denies vision changes  HEENT: denies upper respiratory symptoms  LUNGS: denies cough or shortness of breath with exertion  CHEST:  denies breast changes or pain  CARDIOVASCULAR: denies chest pain or tightness on exertion  VASCULAR: No lower extremity swelling  GI: denies abdominal pain, bowel movement changes, blood in stool  : No complaint of urinary problems, vaginal discharge or discomfort  MUSCULOSKELETAL: denies joint pain   NEURO: denies headaches or dizziness  PSYCH: denies depression or anxiety  ENDOCRINE: No complaints of temperature intolerance, polyuria, or excessive sweating.  LYMPHATICS: No complaints of swollen glands      EXAM:   /70   Pulse 90   Temp 97.9 °F (36.6 °C) (Temporal)   Resp 15   Ht 5' 2.36\" (1.584 m)   Wt 117 lb (53.1 kg)   LMP 04/22/2024 (Approximate)   SpO2 99%   BMI 21.15 kg/m²   Body mass index is 21.15 kg/m².   GENERAL: NAD, Pleasant well-appearing  female.  SKIN: No visible rashes or suspicious lesions.  HEENT: atraumatic, normocephalic, EACs and TMs clear normal bilaterally.  Nose: No nasal discharge.  OP: MMM.  Posteriorly no exudate or erythema.  EYES: PERRL, EOMI, sclera, conjunctiva are clear  NECK: supple, no adenopathy/thyromegaly/masses  LUNGS: Clear to auscultation bilateral, no rales, rhonchi or wheezing  CARDIO: RRR without murmur normal S1S2  ABD: Scaphoid. Soft,  non tender to palpation.  No masses, HSM, or pulsations appreciated.  MUSCULOSKELETAL: gait normal, no gross M/S defect.  EXTREMITIES: no clubbing, cyanosis, or edema  NEURO: Alert and oriented x3.  No gross motor deficit.  PSYCH: normal affect      Immunization History   Administered Date(s) Administered    Covid-19 Vaccine Moderna 100 mcg/0.5 ml 01/21/2021, 02/18/2021    DTP 05/21/1991, 07/17/1991, 09/17/1991, 09/16/1992, 05/15/1996    FLUZONE 6 months and older PFS 0.5 ml (46994) 09/20/2023    Hib, Unspecified Formulation 05/21/1991, 07/17/1991, 09/16/1992    Influenza 10/01/2021, 11/03/2022    MMR 06/17/1992, 05/15/1996    Meningococcal-Menactra 07/27/2016    OPV 05/21/1991, 07/17/1991, 09/16/1992, 05/15/1996    TDAP 07/20/2023       DATA:    Component      Latest Ref Rng 5/8/2018 5/17/2018 2/7/2022 3/28/2023   Vitamin B12      193 - 986 pg/mL >2,000 (H)  1,921 (H)  501  384    VITAMIN D, 25-OH, TOTAL      30.0 - 100.0 ng/mL 29.9 (L)   30.0  24.4 (L)               ASSESSMENT AND PLAN:   Maria Luisa Olea is a 34 year old female who presents for a complete physical exam.        Encounter Diagnoses   Name Primary?    Routine general medical examination at a health care facility Yes    Chronic midline thoracic back pain     Paresthesias     Family history of MS (multiple sclerosis)     Neoplasm of uncertain behavior of skin     Vegan     Vitamin D insufficiency          1. Routine general medical examination at a health care facility  Patient provided handout on women's health and prevention.   Routine health profile labs pending.  Recommend healthy diet including green leafy vegetables, fresh fruits and lean protein.  Aerobic exercise 30 minutes five days a week for cardiovascular fitness and 45-60 minutes 6-7 days a week for weight loss.     - CBC With Differential With Platelet; Future  - Comp Metabolic Panel (14); Future  - Lipid Panel; Future  - Assay, Thyroid Stim Hormone; Future  - Free T4, (Free  Thyroxine); Future    2. Chronic midline thoracic back pain  Due to chronicity of symptoms recommend physical therapy.  If symptoms do not resolve with physical therapy, patient to let me know, would recommend patient see specialist.    - Physical Therapy Referral - [place] Location    3. Paresthesias  Etiology uncertain.  Patient points to right dorsal forearm and anterior right pretibial areas/right dorsal foot, the sensation is like a burning sensation.  Patient notes these symptoms resolved during pregnancy but have returned.  MRI of brain in 2018 was normal.  Recommend MRI of brain.  We should consider B12 deficiency as possible etiology as patient is vegan but she does take a B12 supplement on a regular basis.    - Vitamin D [E]; Future  - Vitamin B12 [E]; Future  - Assay, Thyroid Stim Hormone; Future  - Free T4, (Free Thyroxine); Future  - Neuro Referral - In Network  - MRI BRAIN (W+WO) (CPT=70553); Future    4. Family history of MS (multiple sclerosis)  Maternal aunt.  May put patient at increased risk for MS.    - MRI BRAIN (W+WO) (CPT=70553); Future    5. Neoplasm of uncertain behavior of skin  Patient referred to Dr. Calderon for further evaluation and care.    - Derm Referral - In Network    6. Vegan  Recommend evaluate vitamin D and B12 levels, recommendations pending results.  Recommend continue B12 and vitamin D supplements.    - Vitamin D [E]; Future  - Vitamin B12 [E]; Future    7. Vitamin D insufficiency  Recommend continue vitamin D supplement.    - Vitamin D [E]; Future          Orders Placed This Encounter   Procedures    Vitamin D [E]    Vitamin B12 [E]    CBC With Differential With Platelet    Comp Metabolic Panel (14)    Lipid Panel    Assay, Thyroid Stim Hormone    Free T4, (Free Thyroxine)         Imaging & Consults:  NEURO - INTERNAL  OP REFERRAL TO [facility]  DERM - INTERNAL  MRI BRAIN (W+WO) (CPT=70553)    Return in about 1 year (around 5/1/2025) for Annual wellness  visit.  Sooner if needed..

## 2024-05-08 ENCOUNTER — OFFICE VISIT (OUTPATIENT)
Dept: OBGYN CLINIC | Facility: CLINIC | Age: 34
End: 2024-05-08
Payer: COMMERCIAL

## 2024-05-08 VITALS
HEART RATE: 77 BPM | WEIGHT: 118.25 LBS | SYSTOLIC BLOOD PRESSURE: 116 MMHG | DIASTOLIC BLOOD PRESSURE: 70 MMHG | BODY MASS INDEX: 21.49 KG/M2 | HEIGHT: 62.36 IN

## 2024-05-08 DIAGNOSIS — Z86.018 HISTORY OF BENIGN BREAST TUMOR: ICD-10-CM

## 2024-05-08 DIAGNOSIS — Z01.419 WELL WOMAN EXAM WITH ROUTINE GYNECOLOGICAL EXAM: Primary | ICD-10-CM

## 2024-05-08 PROCEDURE — 3008F BODY MASS INDEX DOCD: CPT | Performed by: NURSE PRACTITIONER

## 2024-05-08 PROCEDURE — 3074F SYST BP LT 130 MM HG: CPT | Performed by: NURSE PRACTITIONER

## 2024-05-08 PROCEDURE — 99395 PREV VISIT EST AGE 18-39: CPT | Performed by: NURSE PRACTITIONER

## 2024-05-08 PROCEDURE — 3078F DIAST BP <80 MM HG: CPT | Performed by: NURSE PRACTITIONER

## 2024-05-08 NOTE — PROGRESS NOTES
Here for Routine Annual Exam  No concerns or questions.    Menses are regular, she has had 2 since delivering. She is still pumping regularly.  Contraception- none/ NFP. Would like to conceive as soon as they can once they are 1 year form prior C/S.    Right breast lump since 2017- benign biopsy. She isn't sure if it has increased in size since her breasts feel so different since pregnancy and breast feeding.    ROS: No Cardiac, Respiratory, GI,  or Neurological symptoms.    PE:  GENERAL: well developed, well nourished, in no apparent distress  SKIN: no rashes, no suspicious lesions  HEENT: normal  NECK: supple; no thyroidmegaly, no adenopathy  LUNGS: clear to auscultation  CARDIOVASCULAR: normal S1, S2, RRR  BREASTS: firm, nontendder, no palpable nodes, no nipple discharge, no skin changes, no axillary adenopathy, there is a right sided 1 inch breast mass in same position as prior noted on imaging that is mobile and non- tender  ABDOMEN: Soft, non distended; non tender, no masses  GYNE/: External Genitalia: Normal without lesions or erythema                      Vagina: normal without lesions, scant discharge                      Uterus: mid, mobile, non tender, normal size                     Cervix: no lesions or CMT                     Adnexa: non tender, no masses, normal size  EXTREMITIES:  non tender without edema    A/P:   1. Well woman exam with routine gynecological exam  Regular self breast exams recommended   Pap deferred    2. History of benign breast tumor  Call with any changes in tissue we will order a mammogram     Return to clinic 1 year for routine exam, or as needed with any concerns or question         room air

## 2024-05-11 ENCOUNTER — LAB ENCOUNTER (OUTPATIENT)
Dept: LAB | Age: 34
End: 2024-05-11
Attending: FAMILY MEDICINE
Payer: COMMERCIAL

## 2024-05-11 DIAGNOSIS — Z00.00 ROUTINE GENERAL MEDICAL EXAMINATION AT A HEALTH CARE FACILITY: ICD-10-CM

## 2024-05-11 DIAGNOSIS — E55.9 VITAMIN D INSUFFICIENCY: ICD-10-CM

## 2024-05-11 DIAGNOSIS — R20.2 PARESTHESIAS: ICD-10-CM

## 2024-05-11 DIAGNOSIS — Z78.9 VEGAN: ICD-10-CM

## 2024-05-11 LAB
ALBUMIN SERPL-MCNC: 4.1 G/DL (ref 3.4–5)
ALBUMIN/GLOB SERPL: 1.3 {RATIO} (ref 1–2)
ALP LIVER SERPL-CCNC: 74 U/L
ALT SERPL-CCNC: 22 U/L
ANION GAP SERPL CALC-SCNC: 5 MMOL/L (ref 0–18)
AST SERPL-CCNC: 17 U/L (ref 15–37)
BASOPHILS # BLD AUTO: 0.03 X10(3) UL (ref 0–0.2)
BASOPHILS NFR BLD AUTO: 0.6 %
BILIRUB SERPL-MCNC: 0.9 MG/DL (ref 0.1–2)
BUN BLD-MCNC: 12 MG/DL (ref 9–23)
CALCIUM BLD-MCNC: 8.7 MG/DL (ref 8.5–10.1)
CHLORIDE SERPL-SCNC: 103 MMOL/L (ref 98–112)
CHOLEST SERPL-MCNC: 138 MG/DL (ref ?–200)
CO2 SERPL-SCNC: 27 MMOL/L (ref 21–32)
CREAT BLD-MCNC: 0.69 MG/DL
EGFRCR SERPLBLD CKD-EPI 2021: 117 ML/MIN/1.73M2 (ref 60–?)
EOSINOPHIL # BLD AUTO: 0.06 X10(3) UL (ref 0–0.7)
EOSINOPHIL NFR BLD AUTO: 1.2 %
ERYTHROCYTE [DISTWIDTH] IN BLOOD BY AUTOMATED COUNT: 13.3 %
FASTING PATIENT LIPID ANSWER: YES
FASTING STATUS PATIENT QL REPORTED: YES
GLOBULIN PLAS-MCNC: 3.1 G/DL (ref 2.8–4.4)
GLUCOSE BLD-MCNC: 92 MG/DL (ref 70–99)
HCT VFR BLD AUTO: 43.4 %
HDLC SERPL-MCNC: 69 MG/DL (ref 40–59)
HGB BLD-MCNC: 14.1 G/DL
IMM GRANULOCYTES # BLD AUTO: 0.02 X10(3) UL (ref 0–1)
IMM GRANULOCYTES NFR BLD: 0.4 %
LDLC SERPL CALC-MCNC: 61 MG/DL (ref ?–100)
LYMPHOCYTES # BLD AUTO: 2.1 X10(3) UL (ref 1–4)
LYMPHOCYTES NFR BLD AUTO: 40.5 %
MCH RBC QN AUTO: 30.5 PG (ref 26–34)
MCHC RBC AUTO-ENTMCNC: 32.5 G/DL (ref 31–37)
MCV RBC AUTO: 93.7 FL
MONOCYTES # BLD AUTO: 0.45 X10(3) UL (ref 0.1–1)
MONOCYTES NFR BLD AUTO: 8.7 %
NEUTROPHILS # BLD AUTO: 2.52 X10 (3) UL (ref 1.5–7.7)
NEUTROPHILS # BLD AUTO: 2.52 X10(3) UL (ref 1.5–7.7)
NEUTROPHILS NFR BLD AUTO: 48.6 %
NONHDLC SERPL-MCNC: 69 MG/DL (ref ?–130)
OSMOLALITY SERPL CALC.SUM OF ELEC: 279 MOSM/KG (ref 275–295)
PLATELET # BLD AUTO: 205 10(3)UL (ref 150–450)
POTASSIUM SERPL-SCNC: 3.6 MMOL/L (ref 3.5–5.1)
PROT SERPL-MCNC: 7.2 G/DL (ref 6.4–8.2)
RBC # BLD AUTO: 4.63 X10(6)UL
SODIUM SERPL-SCNC: 135 MMOL/L (ref 136–145)
T4 FREE SERPL-MCNC: 1 NG/DL (ref 0.8–1.7)
TRIGL SERPL-MCNC: 31 MG/DL (ref 30–149)
TSI SER-ACNC: 0.63 MIU/ML (ref 0.36–3.74)
VIT B12 SERPL-MCNC: 703 PG/ML (ref 193–986)
VIT D+METAB SERPL-MCNC: 29.5 NG/ML (ref 30–100)
VLDLC SERPL CALC-MCNC: 5 MG/DL (ref 0–30)
WBC # BLD AUTO: 5.2 X10(3) UL (ref 4–11)

## 2024-05-11 PROCEDURE — 82306 VITAMIN D 25 HYDROXY: CPT

## 2024-05-11 PROCEDURE — 36415 COLL VENOUS BLD VENIPUNCTURE: CPT

## 2024-05-11 PROCEDURE — 80053 COMPREHEN METABOLIC PANEL: CPT

## 2024-05-11 PROCEDURE — 85025 COMPLETE CBC W/AUTO DIFF WBC: CPT

## 2024-05-11 PROCEDURE — 84443 ASSAY THYROID STIM HORMONE: CPT

## 2024-05-11 PROCEDURE — 80061 LIPID PANEL: CPT

## 2024-05-11 PROCEDURE — 82607 VITAMIN B-12: CPT

## 2024-05-11 PROCEDURE — 84439 ASSAY OF FREE THYROXINE: CPT

## 2024-07-08 ENCOUNTER — APPOINTMENT (OUTPATIENT)
Dept: URBAN - METROPOLITAN AREA CLINIC 247 | Age: 34
Setting detail: DERMATOLOGY
End: 2024-07-08

## 2024-07-08 DIAGNOSIS — D18.0 HEMANGIOMA: ICD-10-CM

## 2024-07-08 DIAGNOSIS — D22 MELANOCYTIC NEVI: ICD-10-CM

## 2024-07-08 DIAGNOSIS — L91.8 OTHER HYPERTROPHIC DISORDERS OF THE SKIN: ICD-10-CM

## 2024-07-08 DIAGNOSIS — L82.1 OTHER SEBORRHEIC KERATOSIS: ICD-10-CM

## 2024-07-08 DIAGNOSIS — Z71.89 OTHER SPECIFIED COUNSELING: ICD-10-CM

## 2024-07-08 PROBLEM — D23.5 OTHER BENIGN NEOPLASM OF SKIN OF TRUNK: Status: ACTIVE | Noted: 2024-07-08

## 2024-07-08 PROBLEM — D22.4 MELANOCYTIC NEVI OF SCALP AND NECK: Status: ACTIVE | Noted: 2024-07-08

## 2024-07-08 PROBLEM — D22.5 MELANOCYTIC NEVI OF TRUNK: Status: ACTIVE | Noted: 2024-07-08

## 2024-07-08 PROBLEM — D18.01 HEMANGIOMA OF SKIN AND SUBCUTANEOUS TISSUE: Status: ACTIVE | Noted: 2024-07-08

## 2024-07-08 PROCEDURE — 99203 OFFICE O/P NEW LOW 30 MIN: CPT

## 2024-07-08 PROCEDURE — OTHER MIPS QUALITY: OTHER

## 2024-07-08 PROCEDURE — OTHER COUNSELING: OTHER

## 2024-07-08 ASSESSMENT — LOCATION SIMPLE DESCRIPTION DERM
LOCATION SIMPLE: RIGHT ANTERIOR NECK
LOCATION SIMPLE: RIGHT LOWER BACK
LOCATION SIMPLE: ABDOMEN
LOCATION SIMPLE: RIGHT UPPER BACK
LOCATION SIMPLE: NECK
LOCATION SIMPLE: CHEST
LOCATION SIMPLE: LEFT UPPER BACK

## 2024-07-08 ASSESSMENT — LOCATION DETAILED DESCRIPTION DERM
LOCATION DETAILED: RIGHT INFERIOR ANTERIOR NECK
LOCATION DETAILED: LEFT SUPERIOR MEDIAL UPPER BACK
LOCATION DETAILED: RIGHT LATERAL ABDOMEN
LOCATION DETAILED: LEFT MEDIAL SUPERIOR CHEST
LOCATION DETAILED: RIGHT SUPERIOR LATERAL MIDBACK
LOCATION DETAILED: RIGHT MID-UPPER BACK
LOCATION DETAILED: LEFT CENTRAL LATERAL NECK

## 2024-07-08 ASSESSMENT — LOCATION ZONE DERM
LOCATION ZONE: NECK
LOCATION ZONE: TRUNK

## 2024-07-08 NOTE — PROCEDURE: COUNSELING
Detail Level: Zone
Detail Level: Simple
Detail Level: Detailed
Sunscreen Recommendation Label Override: SPF 50+

## 2024-07-12 ENCOUNTER — PATIENT MESSAGE (OUTPATIENT)
Dept: FAMILY MEDICINE CLINIC | Facility: CLINIC | Age: 34
End: 2024-07-12

## 2024-07-12 DIAGNOSIS — M54.6 CHRONIC MIDLINE THORACIC BACK PAIN: Primary | ICD-10-CM

## 2024-07-12 DIAGNOSIS — G89.29 CHRONIC MIDLINE THORACIC BACK PAIN: Primary | ICD-10-CM

## 2024-07-12 NOTE — TELEPHONE ENCOUNTER
Patient had OV 5/1 and discussed back pain. She is requesting imaging of her back prior to starting Physical Therapy. Please advise, thanks.     Back pain:  Patient points to mid to lower thoracic area just to the left of center.  No known precipitating or aggravating factors such as movement.  No history of injury.  Pain is intermittent.  Has improved in the past with her  massaging her back.

## 2024-07-12 NOTE — TELEPHONE ENCOUNTER
From: Maria Luisa Olea  To: Saadia Hunter  Sent: 7/12/2024 10:42 AM CDT  Subject: Back pain    Hi,     I had an office visit with Dr. Hunter May 1st where we discussed some chronic upper back pain. Our plan was to do physical therapy, and we would reassess if there were no improvement in symptoms. I have not started PT, but I was just wondering if it would be possible to order imaging just to ensure there is nothing else causing the pain.     Thank you so much for your time!   -Maria Luisa Olea

## 2024-07-16 ENCOUNTER — HOSPITAL ENCOUNTER (OUTPATIENT)
Dept: GENERAL RADIOLOGY | Age: 34
Discharge: HOME | End: 2024-07-16
Attending: FAMILY MEDICINE
Payer: COMMERCIAL

## 2024-07-16 DIAGNOSIS — G89.29 CHRONIC MIDLINE THORACIC BACK PAIN: ICD-10-CM

## 2024-07-16 DIAGNOSIS — M54.6 CHRONIC MIDLINE THORACIC BACK PAIN: ICD-10-CM

## 2024-07-16 PROCEDURE — 72072 X-RAY EXAM THORAC SPINE 3VWS: CPT | Performed by: FAMILY MEDICINE

## 2024-08-11 ENCOUNTER — HOSPITAL ENCOUNTER (OUTPATIENT)
Dept: MRI IMAGING | Age: 34
End: 2024-08-11
Attending: FAMILY MEDICINE
Payer: COMMERCIAL

## 2024-08-11 ENCOUNTER — HOSPITAL ENCOUNTER (OUTPATIENT)
Dept: MRI IMAGING | Age: 34
Discharge: HOME | End: 2024-08-11
Attending: FAMILY MEDICINE
Payer: COMMERCIAL

## 2024-08-11 DIAGNOSIS — Z82.0 FAMILY HISTORY OF MS (MULTIPLE SCLEROSIS): ICD-10-CM

## 2024-08-11 DIAGNOSIS — R20.2 PARESTHESIAS: ICD-10-CM

## 2024-08-11 PROCEDURE — A9575 INJ GADOTERATE MEGLUMI 0.1ML: HCPCS | Performed by: FAMILY MEDICINE

## 2024-08-11 PROCEDURE — 70553 MRI BRAIN STEM W/O & W/DYE: CPT | Performed by: FAMILY MEDICINE

## 2024-08-11 RX ORDER — DIPHENHYDRAMINE HYDROCHLORIDE 50 MG/ML
10 INJECTION, SOLUTION INTRAMUSCULAR; INTRAVENOUS
Status: COMPLETED | OUTPATIENT
Start: 2024-08-11 | End: 2024-08-11

## 2024-08-11 RX ADMIN — DIPHENHYDRAMINE HYDROCHLORIDE 10 ML: 50 INJECTION, SOLUTION INTRAMUSCULAR; INTRAVENOUS at 11:00:00

## 2025-03-02 ENCOUNTER — PATIENT MESSAGE (OUTPATIENT)
Dept: OBGYN CLINIC | Facility: CLINIC | Age: 35
End: 2025-03-02

## 2025-03-05 ENCOUNTER — OFFICE VISIT (OUTPATIENT)
Dept: OBGYN CLINIC | Facility: CLINIC | Age: 35
End: 2025-03-05
Payer: COMMERCIAL

## 2025-03-05 VITALS
SYSTOLIC BLOOD PRESSURE: 132 MMHG | BODY MASS INDEX: 21.71 KG/M2 | WEIGHT: 118 LBS | HEIGHT: 62 IN | HEART RATE: 78 BPM | DIASTOLIC BLOOD PRESSURE: 78 MMHG

## 2025-03-05 DIAGNOSIS — N64.52 NIPPLE DISCHARGE, BLOODY: Primary | ICD-10-CM

## 2025-03-05 PROCEDURE — 3008F BODY MASS INDEX DOCD: CPT | Performed by: OBSTETRICS & GYNECOLOGY

## 2025-03-05 PROCEDURE — 99213 OFFICE O/P EST LOW 20 MIN: CPT | Performed by: OBSTETRICS & GYNECOLOGY

## 2025-03-05 PROCEDURE — 3078F DIAST BP <80 MM HG: CPT | Performed by: OBSTETRICS & GYNECOLOGY

## 2025-03-05 PROCEDURE — 3075F SYST BP GE 130 - 139MM HG: CPT | Performed by: OBSTETRICS & GYNECOLOGY

## 2025-03-05 NOTE — PROGRESS NOTES
Subjective:  34 year old    Chief Complaint   Patient presents with    Breast Problem     Pt c/o of left nipple being itchy a few days ago, when she took off her bra she noticed some brown discharge, was able to hand express discharge as well. Pt hasn't  in 7 months.      Patient complains of one episode of bloody nipple discharge from left breast, spontaneously, and then another episode with expression.  No pain or mass.  Did have breast bx on right breast several years ago which was benign.  Delivered 10/2023    Review of Systems:  Pertinent items are noted in the HPI.    Objective:  /78   Pulse 78   Ht 62\"   Wt 118 lb (53.5 kg)   LMP 2025 (Approximate)     Physical Examination:  General appearance: Well dressed, well nourished in no apparent distress  Neurologic/Psychiatric: Alert and oriented to person, place and time, mood normal, affect appropriate  Breasts:   Symmetric, non-tender, no masses, lesions, retraction, dimpling bilaterally.  Able to express clear/white discharge from three different ducts on left breast with expression.    Assessment/Plan:  Left breast bloody nipple discharge.  Normal CBE.  Check Dx mammogram.  If normal, recommend monitoring.  If recurrent spontaneous discharge, pain or mass, referral breast surgery.    Diagnoses and all orders for this visit:    Nipple discharge, bloody  -     CHARLEEN DIONI 2D+3D DIAGNOSTIC [facility]  BILAT (CPT=77066/60029); Future      Return if symptoms worsen or fail to improve.

## 2025-03-25 ENCOUNTER — HOSPITAL ENCOUNTER (OUTPATIENT)
Dept: MAMMOGRAPHY | Facility: HOSPITAL | Age: 35
Discharge: HOME | End: 2025-03-25
Attending: OBSTETRICS & GYNECOLOGY
Payer: COMMERCIAL

## 2025-03-25 DIAGNOSIS — N64.52 NIPPLE DISCHARGE, BLOODY: ICD-10-CM

## 2025-03-25 DIAGNOSIS — N64.52 BLOODY DISCHARGE FROM LEFT NIPPLE: ICD-10-CM

## 2025-03-25 DIAGNOSIS — D24.9 FIBROADENOMA OF BREAST, UNSPECIFIED LATERALITY: ICD-10-CM

## 2025-03-25 DIAGNOSIS — R92.8 ABNORMAL MAMMOGRAM OF BOTH BREASTS: Primary | ICD-10-CM

## 2025-03-25 PROCEDURE — 77066 DX MAMMO INCL CAD BI: CPT | Performed by: OBSTETRICS & GYNECOLOGY

## 2025-03-25 PROCEDURE — 76641 ULTRASOUND BREAST COMPLETE: CPT | Performed by: OBSTETRICS & GYNECOLOGY

## 2025-03-25 PROCEDURE — 77062 BREAST TOMOSYNTHESIS BI: CPT | Performed by: OBSTETRICS & GYNECOLOGY

## 2025-03-25 NOTE — PROGRESS NOTES
Abnormal mammogram with fibroadenomas.  Recommend bilateral breast MRI and referral to breast surgery.  Patient notified by phone and referral, breast MRI order placed.

## 2025-03-26 ENCOUNTER — TELEPHONE (OUTPATIENT)
Dept: OBGYN CLINIC | Facility: CLINIC | Age: 35
End: 2025-03-26

## 2025-03-26 DIAGNOSIS — R92.8 ABNORMAL MAMMOGRAM OF BOTH BREASTS: ICD-10-CM

## 2025-03-26 DIAGNOSIS — N64.52 NIPPLE DISCHARGE, BLOODY: Primary | ICD-10-CM

## 2025-03-26 DIAGNOSIS — D24.2 FIBROADENOMA OF LEFT BREAST: ICD-10-CM

## 2025-03-26 DIAGNOSIS — D24.9 FIBROADENOMA OF BREAST, UNSPECIFIED LATERALITY: ICD-10-CM

## 2025-03-26 NOTE — TELEPHONE ENCOUNTER
Second Breast MRI order placed by RN. Visibile as a duplicate in Imaging Orders, but it still does not appear in the Referrals. US and Surg referral both on file. Call to IT, Ticket created. Will follow up.

## 2025-03-26 NOTE — TELEPHONE ENCOUNTER
Patient states that she called central scheduling and scheduled her breast MRI for Sunday, March 30 and was advised that a referral is required through her HMO plan.   Please provide referral and advise the patient once completed in case she needs to r/s her appointment due to no referral on file.  Thank you.

## 2025-03-26 NOTE — TELEPHONE ENCOUNTER
RN attempted to place MRI order as screening instead of diagnostic and it does trigger a referral and status is authorized.

## 2025-03-26 NOTE — TELEPHONE ENCOUNTER
P patient has breast MRI scheduled this Sunday, 3/30/25 and requires pre certification. Please advise if patient may keep appointment as scheduled.    [phone]

## 2025-03-26 NOTE — TELEPHONE ENCOUNTER
Hello     We need a order in the system. I do not see a active order.     Thank you  Center   Referral specialist

## 2025-03-30 ENCOUNTER — HOSPITAL ENCOUNTER (OUTPATIENT)
Dept: MRI IMAGING | Facility: HOSPITAL | Age: 35
Discharge: HOME | End: 2025-03-30
Attending: OBSTETRICS & GYNECOLOGY
Payer: COMMERCIAL

## 2025-03-30 ENCOUNTER — HOSPITAL ENCOUNTER (OUTPATIENT)
Dept: MRI IMAGING | Facility: HOSPITAL | Age: 35
End: 2025-03-30
Attending: OBSTETRICS & GYNECOLOGY
Payer: COMMERCIAL

## 2025-03-30 DIAGNOSIS — R92.8 ABNORMAL MAMMOGRAM OF BOTH BREASTS: ICD-10-CM

## 2025-03-30 DIAGNOSIS — N64.52 BLOODY DISCHARGE FROM LEFT NIPPLE: ICD-10-CM

## 2025-03-30 PROCEDURE — 77049 MRI BREAST C-+ W/CAD BI: CPT | Performed by: OBSTETRICS & GYNECOLOGY

## 2025-03-30 PROCEDURE — A9575 INJ GADOTERATE MEGLUMI 0.1ML: HCPCS

## 2025-03-30 RX ORDER — GADOTERATE MEGLUMINE 376.9 MG/ML
15 INJECTION INTRAVENOUS
Status: COMPLETED | OUTPATIENT
Start: 2025-03-30 | End: 2025-03-30

## 2025-03-30 RX ADMIN — GADOTERATE MEGLUMINE 11 ML: 376.9 INJECTION INTRAVENOUS at 10:38:00

## 2025-03-31 NOTE — PROGRESS NOTES
Results reviewed and released to [facility].  Tests show no significant abnormalities.  Still recommend general surgery consultation.

## 2025-04-12 NOTE — ADDENDUM NOTE
Addended by: Mehran Feldman on: 8/7/2023 03:59 PM     Modules accepted: Orders Comprehensive Nutrition Assessment    Type and Reason for Visit:  Initial, Consult (Low Body Weight)    Nutrition Recommendations/Plan:   Continue regular diet  Added Ensure Plus with meals      Malnutrition Assessment:  Malnutrition Status:  Severe malnutrition (04/12/25 1947)    Context:  Chronic Illness     Findings of the 6 clinical characteristics of malnutrition:  Energy Intake:  Mild decrease in energy intake  Weight Loss:  No weight loss     Body Fat Loss:  Severe body fat loss Triceps   Muscle Mass Loss:  Severe muscle mass loss Temples (temporalis), Clavicles (pectoralis & deltoids), Scapula (trapezius)  Fluid Accumulation:  Unable to assess     Strength:  Not Performed    Nutrition Assessment:    Pt. severely malnourished AEB muscle wasting and fat loss r/t alcohol abuse .  At risk for further nutritional compromise r/t admitted with SOB, fatigue and altered nutrition related labs .  Will offer ensure PLUS TID .     Nutrition Related Findings:    pt is A & O x4; c/o SOB and fatigue; h/o ETOH abuse; h/o malnutritiond/t alcohol abuse; low K+; Low hgb Wound Type: None       Current Nutrition Intake & Therapies:    Average Meal Intake: %, 1-25%  Average Supplements Intake: None Ordered  ADULT DIET; Regular    Anthropometric Measures:  Height: 154.9 cm (5' 0.98\")  Ideal Body Weight (IBW): 105 lbs (48 kg)    Admission Body Weight: 37.6 kg (83 lb)  Current Body Weight: 37.6 kg (83 lb), 79 % IBW. Weight Source: Standing scale  Current BMI (kg/m2): 15.7  Usual Body Weight: 36.3 kg (80 lb) (1/4/25)     % Weight Change (Calculated): 3.8                         Estimated Daily Nutrient Needs:  Energy Requirements Based On: Kcal/kg  Weight Used for Energy Requirements: Current  Energy (kcal/day): 6392-7393 based ~ 30-33 kcal/kg cbw  Weight Used for Protein Requirements: Current  Protein (g/day): 57 based ~ 1.5 gr/kg cbw  Method Used for Fluid Requirements: 1 ml/kcal  Fluid (ml/day): 9628-9891    Nutrition

## 2025-06-03 ENCOUNTER — OFFICE VISIT (OUTPATIENT)
Dept: SURGERY | Facility: CLINIC | Age: 35
End: 2025-06-03
Payer: COMMERCIAL

## 2025-06-03 VITALS
OXYGEN SATURATION: 98 % | SYSTOLIC BLOOD PRESSURE: 130 MMHG | BODY MASS INDEX: 21.35 KG/M2 | HEART RATE: 87 BPM | DIASTOLIC BLOOD PRESSURE: 79 MMHG | WEIGHT: 116 LBS | RESPIRATION RATE: 16 BRPM | HEIGHT: 62 IN

## 2025-06-03 DIAGNOSIS — N64.52 BLOODY DISCHARGE FROM LEFT NIPPLE: Primary | ICD-10-CM

## 2025-06-03 PROCEDURE — 3008F BODY MASS INDEX DOCD: CPT | Performed by: SURGERY

## 2025-06-03 PROCEDURE — 3075F SYST BP GE 130 - 139MM HG: CPT | Performed by: SURGERY

## 2025-06-03 PROCEDURE — 3078F DIAST BP <80 MM HG: CPT | Performed by: SURGERY

## 2025-06-03 PROCEDURE — 99204 OFFICE O/P NEW MOD 45 MIN: CPT | Performed by: SURGERY

## 2025-06-03 NOTE — PROGRESS NOTES
Breast Surgery New Patient Consultation    This is the first visit for this 35 year old woman, referred by Dr. Hunter, who presents for evaluation of bloody discharge from left nipple.    History of Present Illness:   Ms. Maria Luisa Olea is a 35 year old woman who presents with a self detected, spontaneous bloody discharge that she first noted on her left nipple in 2025.  She is spine her bra and has been able to hand express this since then.  She denies any palpable masses, skin changes, axillary symptoms or other concerns.  She does have a family history of breast cancer.  She has a personal prior history of benign biopsy from the right breast that she believes took place in 2018.  She was referred for a bilateral diagnostic evaluation in light of the discharge on 2025 and was noted to have extremely dense breast tissue with no mammographic or sonographic correlate to explain the etiology of her left nipple discharge with bilateral benign-appearing masses with imaging characteristics suggestive of fibroadenomas.  An MRI was recommended in light of this and took place on 2025 where she was noted to have dilated left retroareolar ducts with no identified enhancing lesion for surgical consultation was recommended as well as other areas correlating with likely fibroadenomas and no other suspicious findings.  She is here today for evaluation and recommendations for further therapy.        Past Medical History[1]    Past Surgical History[2]    Gynecological History:  Pt is a   Pt was 33 years old at time of first pregnancy.    She has cumulative breastfeeding history of 5 months.  She achieved menarche at age 14 and LMP   She denies any history of hormone replacement therapy   She has history of oral contraceptive use for 1 years, last in .  She denies infertility treatment to achieve pregnancy.    Medications:    Current Medications[3]    Allergies:    Allergies[4]    Family  History:   Family History[5]    She is not of Ashkenazi Adventism ancestry.    Social History:  History   Alcohol Use Not Currently       History   Smoking Status    Never   Smokeless Tobacco    Never     Ms. Maria Luisa Olea is  with 1 children. She has 2 siblings. She is currently Employed full-time    Review of Systems:  General:   The patient denies, fever, chills, night sweats, fatigue, generalized weakness, change in appetite or weight loss.    HEENT:     The patient denies eye irritation, cataracts, redness, glaucoma, yellowing of the eyes, change in vision, color blindness, or +wearing contacts/glasses. The patient denies hearing loss, ringing in the ears, ear drainage, earaches, nasal congestion, nose bleeds, snoring, pain in mouth/throat, hoarseness, change in voice, facial trauma.    Respiratory:  The patient denies chronic cough, phlegm, hemoptysis, pleurisy/chest pain, pneumonia, asthma, wheezing, difficulty in breathing with exertion, emphysema, chronic bronchitis, shortness of breath or abnormal sound when breathing.     Cardiovascular:  There is no history of chest pain, chest pressure/discomfort, palpitations, irregular heartbeat, fainting or near-fainting, difficulty breathing when lying flat, SOB/Coughing at night, swelling of the legs or chest pain while walking.    Breasts:  See history of present illness    Gastrointestinal:     There is no history of difficulty or pain with swallowing, reflux symptoms, vomiting, dark or bloody stools, constipation, yellowing of the skin, indigestion, nausea, change in bowel habits, diarrhea, abdominal pain or vomiting blood.     Genitourinary:  The patient denies frequent urination, needing to get up at night to urinate, urinary hesitancy or retaining urine, painful urination, urinary incontinence, decreased urine stream, blood in the urine or vaginal/penile discharge.    Skin:    The patient denies rash, itching, skin lesions, dry skin, change in skin  color or change in moles.     Hematologic/Lymphatic:  The patient denies easily bruising or bleeding or persistent swollen glands or lymph nodes.     Musculoskeletal:  The patient denies muscle aches/pain, joint pain, stiff joints, neck pain, back pain or bone pain.    Neuropsychiatric:  There is no history of migraines or severe headaches, seizure/epilepsy, speech problems, coordination problems, trembling/tremors, fainting/black outs, dizziness, memory problems, loss of sensation/numbness, problems walking, weakness, tingling or burning in hands/feet. There is no history of abusive relationship, bipolar disorder, sleep disturbance, anxiety, depression or feeling of despair.    Endocrine:    There is no history of poor/slow wound healing, weight loss/gain, fertility or hormone problems, cold intolerance, thyroid disease.     Allergic/Immunologic:  There is no history of hives, hay fever, angioedema or anaphylaxis.    /79 (BP Location: Right arm, Patient Position: Sitting, Cuff Size: adult)   Pulse 87   Resp 16   Ht 1.575 m (5' 2\")   Wt 52.6 kg (116 lb)   LMP 03/08/2025 (Approximate)   SpO2 98%   BMI 21.22 kg/m²     Physical Exam:  The patient is an alert, oriented, well-nourished and  well-developed woman who appears her stated age. Her speech patterns and movements are normal. Her affect is appropriate.    HEENT: The head is normocephalic. The neck is supple. The thyroid is not enlarged and is without palpable masses/nodules. There are no palpable masses. The trachea is in the midline. Conjunctiva are clear, non-icteric.    Chest: The chest expands symmetrically. The lungs are clear to auscultation.    Heart: The rhythm is regular.  There are no murmurs, rubs, gallops or thrills.    Breasts:  Her breasts are symmetrical with a cup size 34C.  Right breast: The skin, nipple ,and areola appear normal. There is no skin dimpling with movement of the pectoralis. There is no nipple retraction. No nipple  discharge can be elicited. The parenchyma is mildly nodular. There are no dominant masses in the breast. The axillary tail is normal.  Left breast:   The skin, nipple, and areola appear normal. There is no skin dimpling with movement of the pectoralis. There is no nipple retraction.  Positive serous discharge can be elicited from single central 12:00 duct. The parenchyma is mildly nodular. There are no dominant masses in the breast. The axillary tail is normal.    Abdomen:  The abdomen is soft, flat and non tender. The liver is not enlarged. There are no palpable masses.    Lymph Nodes:  The supraclavicular, axillary and cervical regions are free of significant lymphadenopathy.    Back: There is no vertebral column tenderness.    Skin: The skin appears normal. There are no suspicious appearing rashes or lesions.    Extremities: The extremities are without deformity, cyanosis or edema.    Impression:   Ms. Maria Luisa Olea is a 35 year old woman presents with spontaneous, bloody left nipple discharge with no imaging correlate.    Discussion and Plan:  I had a discussion with the Patient regarding her breast exam. On exam today I found expressible discharge from a single central duct on the left with no other clinical findings bilaterally.  I personally reviewed the imaging which did not show any explanation for the spontaneous discharge on the left side.  For both therapeutic and diagnostic purposes I am therefore recommending a left terminal duct excision.  The risks and possible complications of this procedure were explained to the patient and she understood and agreed to the proposed plan. She was given ample opportunity for questions and those questions were answered to her satisfaction. She has been  encouraged to contact the office with any questions or concerns prior to her next appointment.     This note was created by Dragon voice recognition. Errors in content may be related to improper recognition by  the system; efforts to review and correct have been done but errors may still exist. Please be advised the primary purpose of this note is for me to communicate medical care. Standard sentence structure is not always used. Medical terminology and medical abbreviations may be used. There may be grammatical, typographical, and automated fill ins that may have errors missed in proofreading.           [1]   Past Medical History:   Abdominal pain    RUQ    Breech presentation, no version (HCC)    Declines ECV  [ ] PCS 10/05    Calculus of kidney    No further issues with kidney stones after this date    Chronic cough    COVID-19 affecting pregnancy in third trimester (HCC)    Food intolerance    History of Helicobacter pylori infection    Positive breath test    Lymphocytopenia    Tingling in extremities    Fingertips and Toetips    Vegan    Wears glasses   [2]   Past Surgical History:  Procedure Laterality Date      10/05/2023    Needle biopsy right Right 2017    US guided biopsy -benign    Skin graft procedure  2012    3rd finger on left hand    [place] teeth removed Bilateral    [3]   No outpatient medications have been marked as taking for the 6/3/25 encounter (Appointment) with Ayanna Frausto MD.   [4] No Known Allergies  [5]   Family History  Problem Relation Age of Onset    Hypertension Mother     Diabetes Mother         Type 2    Other (Lymphoma) Mother     Other (Cirrhosis) Mother     Colon Polyps Mother         50’s    Hypertension Father     Colon Polyps Father         50’s    Hypertension Brother     Lung Disorder Maternal Grandmother     Heart Attack Maternal Grandfather     Other (MVA) Paternal Grandmother     Colon Cancer Paternal Grandfather     Breast Cancer Paternal Aunt 75        estimated age

## 2025-06-03 NOTE — PATIENT INSTRUCTIONS
Dr. Ayanna Frausto  Tel: [phone]  Fax: [phone] [place] [address]  [phone]     Surgery/Procedure: Left breast terminal duct excision     Anesthesia:   MAC  Surgery Length:   45 minutes CPT:  53050   Wire LOC:   No   Nuc Med:   No   Ivy Seed:  No       Dx & ICD-10: Bloody discharge from left nipple (N64.52)   Radiology Instructions: N/A   _______________________________________________________________________________    Someone must accompany you the day of the procedure to drive you home safely, because of anesthesia.  You will need an adult  to stay with you the first night following your surgery.  You must remove any kind of makeup, acrylic nails, lotions, powders, creams or deodorant.  [facility] ONLY: Pre-admission will give instruct you on when to take Gatorade and Tylenol/acetaminophen prior to your surgery, purchase 2 - 12oz bottles of regular Gatorade (NOT RED/SUGAR FREE). Otherwise, you may not eat or drink anything else after 11PM the night before surgery.    [facility] ONLY: You may not eat or drink anything after midnight the day of your surgery.   Wear comfortable clothing that can be easily removed.  If you wear dentures, contacts lenses, or any prosthesis, you will be asked to remove them.  Do not drink alcohol or smoke 24 hours prior to your procedure.  Bring a picture ID and your insurance card.  Covid-19 testing is no longer required before surgery unless you are experiencing symptoms such as fever, cough, congestion, etc.   The Pre-Admission Testing Department will call the day before to confirm your procedure, give you the time you need to arrive by and directions on where to go. They begin making calls after 2pm, if you are not contacted by 4pm, please call the surgeon's office listed above.  Do not take any blood thinners at least one week prior to the procedure/surgery. This includes aspirin, baby aspirin, Ibuprofen products,  herbal supplements, diet medications, vitamin E, fish oil and green tea supplements. Please check other supplements for these ingredients. *TYLENOL or acetaminophen is acceptable*  If you take Coumadin, Plavix, Xarelto, or Eliquis, please contact your prescribing physician for special instructions on how long to hold. If you take insulin contact your primary care physician for special instructions.  Our surgery scheduler, Neema, will be contacting you to discuss surgery dates. If you have any questions related to scheduling your surgery, please reach out to her at [phone].  _____________________________________________________________________  PRE-OPERATIVE TESTING IF INDICATED BELOW  PLEASE COMPLETE ASAP (AT LEAST 14-21 DAYS PRIOR TO SURGERY)  [] CBC [] BMP [] CMP [] EKG    [] PT, PTT, INR [] Cardiac Clearance  [] H&P Medical Clearance [] Chest X-ray     Please call Central Scheduling to schedule an appointment for pre-operative labs/tests once your orders are placed @ [phone]  Does the patient have a pacemaker or ICD?                              Faxitron/Trident in OR?  [] Yes   [x] No                               [] Yes   [x] No

## 2025-06-10 ENCOUNTER — TELEPHONE (OUTPATIENT)
Dept: SURGERY | Facility: CLINIC | Age: 35
End: 2025-06-10

## 2025-06-10 ENCOUNTER — TELEPHONE (OUTPATIENT)
Dept: SURGERY | Facility: HOSPITAL | Age: 35
End: 2025-06-10

## 2025-06-10 DIAGNOSIS — N64.52 BLOODY DISCHARGE FROM LEFT NIPPLE: Primary | ICD-10-CM

## 2025-06-10 NOTE — TELEPHONE ENCOUNTER
Calling pt in regards to scheduling surgery.  Informed pt that I have 07/28/2025 available at [facility] with Dr. Frausto.  Pt verbalized understanding and in agreement with date and location.  All questions answered.   Encouraged pt to call or CDPt message office with any other questions or concerns.

## 2025-06-13 ENCOUNTER — TELEPHONE (OUTPATIENT)
Dept: FAMILY MEDICINE CLINIC | Facility: CLINIC | Age: 35
End: 2025-06-13

## 2025-06-13 DIAGNOSIS — D48.5 NEOPLASM OF UNCERTAIN BEHAVIOR OF SKIN: Primary | ICD-10-CM

## 2025-06-13 NOTE — TELEPHONE ENCOUNTER
Maria Luisa Olea is calling for a referral to:    Specialist Name: Dr. Sravan Calderon  Specialty: Dermatology   New Referral vs. Follow-up:  follow up   Reason/Diagnosis: Neoplasm of uncertain behavior of skin     The patient has been informed that the referral process may take 5 - 7 business days to complete.    Once the referral is approved, the patient will be notified via [employer]. Alternatively, the patient may contact the Referral Department at [phone] to check the status.

## 2025-06-26 PROBLEM — N64.52 BLOODY DISCHARGE FROM LEFT NIPPLE: Status: ACTIVE | Noted: 2025-06-26

## 2025-07-21 ENCOUNTER — APPOINTMENT (OUTPATIENT)
Dept: URBAN - METROPOLITAN AREA CLINIC 247 | Age: 35
Setting detail: DERMATOLOGY
End: 2025-07-21

## 2025-07-21 DIAGNOSIS — D18.0 HEMANGIOMA: ICD-10-CM

## 2025-07-21 DIAGNOSIS — L81.3 CAFÉ AU LAIT SPOTS: ICD-10-CM

## 2025-07-21 DIAGNOSIS — L91.8 OTHER HYPERTROPHIC DISORDERS OF THE SKIN: ICD-10-CM

## 2025-07-21 DIAGNOSIS — L82.1 OTHER SEBORRHEIC KERATOSIS: ICD-10-CM

## 2025-07-21 DIAGNOSIS — L57.8 OTHER SKIN CHANGES DUE TO CHRONIC EXPOSURE TO NONIONIZING RADIATION: ICD-10-CM

## 2025-07-21 PROBLEM — D23.5 OTHER BENIGN NEOPLASM OF SKIN OF TRUNK: Status: ACTIVE | Noted: 2025-07-21

## 2025-07-21 PROBLEM — D18.01 HEMANGIOMA OF SKIN AND SUBCUTANEOUS TISSUE: Status: ACTIVE | Noted: 2025-07-21

## 2025-07-21 PROCEDURE — 99213 OFFICE O/P EST LOW 20 MIN: CPT

## 2025-07-21 PROCEDURE — OTHER COUNSELING: OTHER

## 2025-07-21 PROCEDURE — OTHER MIPS QUALITY: OTHER

## 2025-07-21 ASSESSMENT — LOCATION ZONE DERM
LOCATION ZONE: FACE
LOCATION ZONE: NECK
LOCATION ZONE: ARM
LOCATION ZONE: TRUNK

## 2025-07-21 ASSESSMENT — LOCATION SIMPLE DESCRIPTION DERM
LOCATION SIMPLE: RIGHT LOWER BACK
LOCATION SIMPLE: LEFT CHEEK
LOCATION SIMPLE: RIGHT UPPER BACK
LOCATION SIMPLE: RIGHT ANTERIOR NECK
LOCATION SIMPLE: ABDOMEN
LOCATION SIMPLE: LEFT POSTERIOR UPPER ARM

## 2025-07-21 ASSESSMENT — LOCATION DETAILED DESCRIPTION DERM
LOCATION DETAILED: RIGHT MID-UPPER BACK
LOCATION DETAILED: RIGHT LATERAL ABDOMEN
LOCATION DETAILED: RIGHT INFERIOR ANTERIOR NECK
LOCATION DETAILED: LEFT INFERIOR CENTRAL MALAR CHEEK
LOCATION DETAILED: LEFT PROXIMAL LATERAL POSTERIOR UPPER ARM
LOCATION DETAILED: RIGHT SUPERIOR LATERAL MIDBACK

## 2025-07-28 ENCOUNTER — ANESTHESIA EVENT (OUTPATIENT)
Dept: SURGERY | Facility: HOSPITAL | Age: 35
End: 2025-07-28
Payer: COMMERCIAL

## 2025-07-28 ENCOUNTER — HOSPITAL ENCOUNTER (OUTPATIENT)
Facility: HOSPITAL | Age: 35
Setting detail: HOSPITAL OUTPATIENT SURGERY
Discharge: HOME | End: 2025-07-28
Attending: SURGERY | Admitting: SURGERY

## 2025-07-28 ENCOUNTER — ANESTHESIA (OUTPATIENT)
Dept: SURGERY | Facility: HOSPITAL | Age: 35
End: 2025-07-28
Payer: COMMERCIAL

## 2025-07-28 VITALS
BODY MASS INDEX: 19.88 KG/M2 | OXYGEN SATURATION: 100 % | SYSTOLIC BLOOD PRESSURE: 125 MMHG | DIASTOLIC BLOOD PRESSURE: 78 MMHG | HEART RATE: 74 BPM | TEMPERATURE: 99 F | HEIGHT: 62 IN | RESPIRATION RATE: 16 BRPM | WEIGHT: 108 LBS

## 2025-07-28 DIAGNOSIS — N64.52 BLOODY DISCHARGE FROM LEFT NIPPLE: ICD-10-CM

## 2025-07-28 LAB — B-HCG UR QL: NEGATIVE

## 2025-07-28 PROCEDURE — 81025 URINE PREGNANCY TEST: CPT

## 2025-07-28 PROCEDURE — 88305 TISSUE EXAM BY PATHOLOGIST: CPT | Performed by: SURGERY

## 2025-07-28 RX ORDER — ALBUTEROL SULFATE 0.83 MG/ML
2.5 SOLUTION RESPIRATORY (INHALATION) AS NEEDED
Status: DISCONTINUED | OUTPATIENT
Start: 2025-07-28 | End: 2025-07-28

## 2025-07-28 RX ORDER — PROCHLORPERAZINE EDISYLATE 5 MG/ML
5 INJECTION INTRAMUSCULAR; INTRAVENOUS EVERY 8 HOURS PRN
Status: DISCONTINUED | OUTPATIENT
Start: 2025-07-28 | End: 2025-07-28

## 2025-07-28 RX ORDER — ACETAMINOPHEN 500 MG
1000 TABLET ORAL ONCE
Status: DISCONTINUED | OUTPATIENT
Start: 2025-07-28 | End: 2025-07-28 | Stop reason: HOSPADM

## 2025-07-28 RX ORDER — MIDAZOLAM HYDROCHLORIDE 1 MG/ML
1 INJECTION INTRAMUSCULAR; INTRAVENOUS EVERY 5 MIN PRN
Status: DISCONTINUED | OUTPATIENT
Start: 2025-07-28 | End: 2025-07-28

## 2025-07-28 RX ORDER — SCOPOLAMINE 1 MG/3D
1 PATCH, EXTENDED RELEASE TRANSDERMAL ONCE
Status: DISCONTINUED | OUTPATIENT
Start: 2025-07-28 | End: 2025-07-28 | Stop reason: HOSPADM

## 2025-07-28 RX ORDER — DEXAMETHASONE SODIUM PHOSPHATE 4 MG/ML
VIAL (ML) INJECTION AS NEEDED
Status: DISCONTINUED | OUTPATIENT
Start: 2025-07-28 | End: 2025-07-28 | Stop reason: SURG

## 2025-07-28 RX ORDER — HYDROCODONE BITARTRATE AND ACETAMINOPHEN 5; 325 MG/1; MG/1
1 TABLET ORAL ONCE AS NEEDED
Status: DISCONTINUED | OUTPATIENT
Start: 2025-07-28 | End: 2025-07-28

## 2025-07-28 RX ORDER — BUPIVACAINE HYDROCHLORIDE 5 MG/ML
INJECTION, SOLUTION EPIDURAL; INTRACAUDAL; PERINEURAL AS NEEDED
Status: DISCONTINUED | OUTPATIENT
Start: 2025-07-28 | End: 2025-07-28 | Stop reason: HOSPADM

## 2025-07-28 RX ORDER — MIDAZOLAM HYDROCHLORIDE 1 MG/ML
INJECTION INTRAMUSCULAR; INTRAVENOUS AS NEEDED
Status: DISCONTINUED | OUTPATIENT
Start: 2025-07-28 | End: 2025-07-28 | Stop reason: SURG

## 2025-07-28 RX ORDER — IBUPROFEN 600 MG/1
600 TABLET, FILM COATED ORAL ONCE AS NEEDED
Status: DISCONTINUED | OUTPATIENT
Start: 2025-07-28 | End: 2025-07-28

## 2025-07-28 RX ORDER — SODIUM CHLORIDE, SODIUM LACTATE, POTASSIUM CHLORIDE, CALCIUM CHLORIDE 600; 310; 30; 20 MG/100ML; MG/100ML; MG/100ML; MG/100ML
INJECTION, SOLUTION INTRAVENOUS CONTINUOUS
Status: DISCONTINUED | OUTPATIENT
Start: 2025-07-28 | End: 2025-07-28

## 2025-07-28 RX ORDER — NALOXONE HYDROCHLORIDE 0.4 MG/ML
0.08 INJECTION, SOLUTION INTRAMUSCULAR; INTRAVENOUS; SUBCUTANEOUS AS NEEDED
Status: DISCONTINUED | OUTPATIENT
Start: 2025-07-28 | End: 2025-07-28

## 2025-07-28 RX ORDER — HYDROMORPHONE HYDROCHLORIDE 1 MG/ML
0.6 INJECTION, SOLUTION INTRAMUSCULAR; INTRAVENOUS; SUBCUTANEOUS EVERY 5 MIN PRN
Status: DISCONTINUED | OUTPATIENT
Start: 2025-07-28 | End: 2025-07-28

## 2025-07-28 RX ORDER — ONDANSETRON 2 MG/ML
4 INJECTION INTRAMUSCULAR; INTRAVENOUS EVERY 6 HOURS PRN
Status: DISCONTINUED | OUTPATIENT
Start: 2025-07-28 | End: 2025-07-28

## 2025-07-28 RX ORDER — LIDOCAINE HYDROCHLORIDE AND EPINEPHRINE 10; 10 MG/ML; UG/ML
INJECTION, SOLUTION INFILTRATION; PERINEURAL AS NEEDED
Status: DISCONTINUED | OUTPATIENT
Start: 2025-07-28 | End: 2025-07-28 | Stop reason: HOSPADM

## 2025-07-28 RX ORDER — LABETALOL HYDROCHLORIDE 5 MG/ML
5 INJECTION, SOLUTION INTRAVENOUS EVERY 5 MIN PRN
Status: DISCONTINUED | OUTPATIENT
Start: 2025-07-28 | End: 2025-07-28

## 2025-07-28 RX ORDER — LIDOCAINE HYDROCHLORIDE 10 MG/ML
INJECTION, SOLUTION EPIDURAL; INFILTRATION; INTRACAUDAL; PERINEURAL AS NEEDED
Status: DISCONTINUED | OUTPATIENT
Start: 2025-07-28 | End: 2025-07-28 | Stop reason: SURG

## 2025-07-28 RX ORDER — ACETAMINOPHEN 500 MG
1000 TABLET ORAL ONCE AS NEEDED
Status: DISCONTINUED | OUTPATIENT
Start: 2025-07-28 | End: 2025-07-28

## 2025-07-28 RX ORDER — HYDROCODONE BITARTRATE AND ACETAMINOPHEN 5; 325 MG/1; MG/1
2 TABLET ORAL ONCE AS NEEDED
Status: DISCONTINUED | OUTPATIENT
Start: 2025-07-28 | End: 2025-07-28

## 2025-07-28 RX ORDER — HYDROCODONE BITARTRATE AND ACETAMINOPHEN 5; 325 MG/1; MG/1
1-2 TABLET ORAL EVERY 6 HOURS PRN
Qty: 20 TABLET | Refills: 0 | Status: SHIPPED | OUTPATIENT
Start: 2025-07-28 | End: 2025-08-05 | Stop reason: ALTCHOICE

## 2025-07-28 RX ORDER — HYDROMORPHONE HYDROCHLORIDE 1 MG/ML
0.2 INJECTION, SOLUTION INTRAMUSCULAR; INTRAVENOUS; SUBCUTANEOUS EVERY 5 MIN PRN
Status: DISCONTINUED | OUTPATIENT
Start: 2025-07-28 | End: 2025-07-28

## 2025-07-28 RX ORDER — HYDROMORPHONE HYDROCHLORIDE 1 MG/ML
0.4 INJECTION, SOLUTION INTRAMUSCULAR; INTRAVENOUS; SUBCUTANEOUS EVERY 5 MIN PRN
Status: DISCONTINUED | OUTPATIENT
Start: 2025-07-28 | End: 2025-07-28

## 2025-07-28 RX ORDER — ONDANSETRON 2 MG/ML
INJECTION INTRAMUSCULAR; INTRAVENOUS AS NEEDED
Status: DISCONTINUED | OUTPATIENT
Start: 2025-07-28 | End: 2025-07-28 | Stop reason: SURG

## 2025-07-28 RX ADMIN — DEXAMETHASONE SODIUM PHOSPHATE 4 MG: 4 MG/ML VIAL (ML) INJECTION at 07:36:00

## 2025-07-28 RX ADMIN — MIDAZOLAM HYDROCHLORIDE 2 MG: 1 INJECTION INTRAMUSCULAR; INTRAVENOUS at 07:29:00

## 2025-07-28 RX ADMIN — SODIUM CHLORIDE, SODIUM LACTATE, POTASSIUM CHLORIDE, CALCIUM CHLORIDE: 600; 310; 30; 20 INJECTION, SOLUTION INTRAVENOUS at 07:26:00

## 2025-07-28 RX ADMIN — LIDOCAINE HYDROCHLORIDE 50 MG: 10 INJECTION, SOLUTION EPIDURAL; INFILTRATION; INTRACAUDAL; PERINEURAL at 07:34:00

## 2025-07-28 RX ADMIN — SODIUM CHLORIDE, SODIUM LACTATE, POTASSIUM CHLORIDE, CALCIUM CHLORIDE: 600; 310; 30; 20 INJECTION, SOLUTION INTRAVENOUS at 08:10:00

## 2025-07-28 RX ADMIN — ONDANSETRON 4 MG: 2 INJECTION INTRAMUSCULAR; INTRAVENOUS at 07:36:00

## 2025-07-28 NOTE — ANESTHESIA PREPROCEDURE EVALUATION
PRE-OP EVALUATION    Patient Name: Maria Luisa Olea    Admit Diagnosis: Bloody discharge from left nipple [N64.52]    Pre-op Diagnosis: Bloody discharge from left nipple [N64.52]    Left breast terminal duct excision    Anesthesia Procedure: Left breast terminal duct excision (Left: Breast)    Surgeons and Role:     * Ayanna Frausto MD - Primary    Pre-op vitals reviewed.  Temp: 97.8 °F (36.6 °C)  Pulse: 80  Resp: 18  BP: 134/82  SpO2: 100 %  Body mass index is 19.75 kg/m².    Current medications reviewed.  Hospital Medications:  Current Medications[1]    Outpatient Medications:   Prescriptions Prior to Admission[2]    Allergies: Patient has no known allergies.      Anesthesia Evaluation        Anesthetic Complications           GI/Hepatic/Renal      (+) GERD and well controlled      (-) chronic renal disease   (-) liver disease                 Cardiovascular                  (-) hypertension     (-) CAD  (-) past MI  (-) CABG/stent  (-) pacemaker/AICD  (-) valvular problems/murmurs     (-) dysrhythmias   (-) CHF                Endo/Other      (-) diabetes                            Pulmonary      (-) asthma  (-) COPD                   Neuro/Psych          (-) CVA     (-) seizures                       Past Surgical History[3]  Social Hx on file[4]  History   Drug Use Unknown     Available pre-op labs reviewed.               Airway      Mallampati: II  Mouth opening: 3 FB  TM distance: 4 - 6 cm  Neck ROM: full Cardiovascular    Cardiovascular exam normal.         Dental    Dentition appears grossly intact         Pulmonary    Pulmonary exam normal.                 Other findings              ASA: 2   Plan: MAC             Plan/risks discussed with: patient                Present on Admission:  **None**             [1]    acetaminophen (Tylenol Extra Strength) tab 1,000 mg  1,000 mg Oral Once    scopolamine (Transderm-Scop) 1 MG/3DAYS patch 1 patch  1 patch Transdermal Once    lactated ringers infusion    Intravenous Continuous    ceFAZolin (Ancef) 2g in 10mL IV syringe premix  2 g Intravenous Once    ceFAZolin (Ancef) 2 g/10mL IV syringe premix       [2]   Medications Prior to Admission   Medication Sig Dispense Refill Last Dose/Taking    Ohhsaj-Zeonnlmwv-Ffrf-Fish Oil (PRENATAL + COMPLETE MULTI OR) Take 1 tablet by mouth daily.   2025    B Complex-C Oral Tab Take 1 tablet by mouth in the morning.   2025   [3]   Past Surgical History:  Procedure Laterality Date      10/05/2023    Needle biopsy right Right 2017    US guided biopsy -benign    Skin graft procedure  2012    3rd finger on left hand    [place] teeth removed Bilateral    [4]   Social History  Socioeconomic History    Marital status:    Tobacco Use    Smoking status: Never    Smokeless tobacco: Never   Vaping Use    Vaping status: Never Used   Substance and Sexual Activity    Alcohol use: Not Currently    Drug use: Never    Sexual activity: Yes     Partners: Male   Other Topics Concern    Caffeine Concern No    Exercise No     Comment: 3x wk    Seat Belt Yes

## 2025-07-28 NOTE — ANESTHESIA POSTPROCEDURE EVALUATION
[facility]    Maria Luisa Olea Patient Status:  Hospital Outpatient Surgery   Age/Gender 35 year old female MRN [id number]   Location [facility] SURGERY Attending Ayanna Frausto MD   Hosp Day # 0 PCP Saadia Hunter DO       Anesthesia Post-op Note    Left breast terminal duct excision    Procedure Summary       Date: 07/28/25 Room / Location:  MAIN OR 06 /  MAIN OR    Anesthesia Start: 0726 Anesthesia Stop: 0815    Procedure: Left breast terminal duct excision (Left: Breast) Diagnosis:       Bloody discharge from left nipple      (Bloody discharge from left nipple [N64.52])    Surgeons: Ayanna Frausto MD Anesthesiologist: Gonzalez Hurst MD    Anesthesia Type: MAC ASA Status: 2            Anesthesia Type: No value filed.    Vitals Value Taken Time   /65 07/28/25 08:18   Temp 98.1 07/28/25 08:18   Pulse 86 07/28/25 08:18   Resp 10 07/28/25 08:18   SpO2 99% 07/28/25 08:18           Patient Location: Same Day Surgery    Anesthesia Type: MAC    Airway Patency: patent    Postop Pain Control: adequate    Mental Status: preanesthetic baseline    Nausea/Vomiting: none    Cardiopulmonary/Hydration status: stable euvolemic    Complications: no apparent anesthesia related complications    Postop vital signs: stable    Dental Exam: Unchanged from Preop    Patient to be discharged from PACU when criteria met.

## 2025-08-05 ENCOUNTER — OFFICE VISIT (OUTPATIENT)
Dept: SURGERY | Facility: CLINIC | Age: 35
End: 2025-08-05

## 2025-08-05 VITALS
SYSTOLIC BLOOD PRESSURE: 127 MMHG | OXYGEN SATURATION: 100 % | DIASTOLIC BLOOD PRESSURE: 78 MMHG | BODY MASS INDEX: 20 KG/M2 | WEIGHT: 110 LBS | HEART RATE: 77 BPM

## 2025-08-05 DIAGNOSIS — N64.52 BLOODY DISCHARGE FROM LEFT NIPPLE: Primary | ICD-10-CM

## 2025-08-05 PROCEDURE — 99024 POSTOP FOLLOW-UP VISIT: CPT

## 2025-08-05 PROCEDURE — 3074F SYST BP LT 130 MM HG: CPT

## 2025-08-05 PROCEDURE — 3078F DIAST BP <80 MM HG: CPT

## 2025-08-12 ENCOUNTER — OFFICE VISIT (OUTPATIENT)
Dept: SURGERY | Facility: CLINIC | Age: 35
End: 2025-08-12

## 2025-08-12 VITALS
SYSTOLIC BLOOD PRESSURE: 134 MMHG | RESPIRATION RATE: 16 BRPM | WEIGHT: 110 LBS | DIASTOLIC BLOOD PRESSURE: 86 MMHG | OXYGEN SATURATION: 100 % | HEART RATE: 65 BPM | BODY MASS INDEX: 20 KG/M2

## 2025-08-12 DIAGNOSIS — N60.42: Primary | ICD-10-CM

## 2025-08-12 PROCEDURE — 99024 POSTOP FOLLOW-UP VISIT: CPT | Performed by: SURGERY

## 2025-08-12 PROCEDURE — 3075F SYST BP GE 130 - 139MM HG: CPT | Performed by: SURGERY

## 2025-08-12 PROCEDURE — 3079F DIAST BP 80-89 MM HG: CPT | Performed by: SURGERY

## 2025-08-15 PROBLEM — N60.42: Status: ACTIVE | Noted: 2025-08-15

## (undated) DEVICE — SUT PERMA- 2-0 30IN SH NABSRB BLK L26MM 1/

## (undated) DEVICE — Device

## (undated) DEVICE — SOLUTION IRRIG 1000ML 0.9% NACL USP BTL

## (undated) DEVICE — WECK HORIZON MED BLUE CLIP DISP

## (undated) DEVICE — SUT MCRYL 4-0 18IN PS-2 ABSRB UD 19MM 3/8 CIR

## (undated) DEVICE — PACK CDS BREAST-HERNIA-PORT

## (undated) DEVICE — COVER LT HNDL RIG FOR SUR CAM DISP

## (undated) DEVICE — DRAPE PACK CHEST AND U BAR

## (undated) DEVICE — SUT CHRM GUT 3-0 27IN SH ABSRB UD 26MM 1/2

## (undated) DEVICE — GLOVE SUR 6.5 SENSICARE PI PIP CRM PWD F

## (undated) DEVICE — SLEEVE COMPR MD KNEE LEN SGL USE KENDALL SCD

## (undated) NOTE — LETTER
June 13, 2023      [address]  [address] [facility] [address]      Dear Raysa Hyde: Your health insurance company has notified us that you have chosen or been assigned to Dr. Lamin Madrid as your Primary Care Physician (PCP). We have not had the pleasure of establishing care with you as of yet. If you have established care with a different provider and feel that Dr. Lamin Madrid is not the provider that you have chosen then you must contact your insurance company to remove Dr. Lamin Madrid as the PCP. Please provide your insurance company with the name of the current PCP that you have established care with. This way the insurance company will update their records to have the correct provider name and info on file and eliminate any future outreach calls and/or correspondence letters from our office. Due to HIPAA and insurance requirements, [employer] employees are unable to [employer] on your behalf to change your PCP. This PCP change must be done by the policy pack of the insurance. Please give our office a call at your earliest convenience, so that we can help you become established with us. You may call the office at [phone] to speak with the .      Thank you,  The office of Dr. Lamin Madrid